# Patient Record
Sex: FEMALE | Race: BLACK OR AFRICAN AMERICAN | NOT HISPANIC OR LATINO | Employment: UNEMPLOYED | URBAN - NONMETROPOLITAN AREA
[De-identification: names, ages, dates, MRNs, and addresses within clinical notes are randomized per-mention and may not be internally consistent; named-entity substitution may affect disease eponyms.]

---

## 2020-01-02 ENCOUNTER — HOSPITAL ENCOUNTER (EMERGENCY)
Facility: HOSPITAL | Age: 40
Discharge: HOME | End: 2020-01-02
Attending: EMERGENCY MEDICINE | Admitting: EMERGENCY MEDICINE

## 2020-01-02 ENCOUNTER — APPOINTMENT (OUTPATIENT)
Dept: CT IMAGING | Facility: HOSPITAL | Age: 40
End: 2020-01-02

## 2020-01-02 VITALS
OXYGEN SATURATION: 100 % | HEART RATE: 62 BPM | SYSTOLIC BLOOD PRESSURE: 137 MMHG | DIASTOLIC BLOOD PRESSURE: 80 MMHG | HEIGHT: 66 IN | WEIGHT: 212 LBS | TEMPERATURE: 98.2 F | RESPIRATION RATE: 18 BRPM | BODY MASS INDEX: 34.07 KG/M2

## 2020-01-02 DIAGNOSIS — K52.9 GASTROENTERITIS: Primary | ICD-10-CM

## 2020-01-02 LAB
ALBUMIN SERPL-MCNC: 4.3 G/DL (ref 3.5–5.2)
ALBUMIN/GLOB SERPL: 1.2 G/DL
ALP SERPL-CCNC: 122 U/L (ref 39–117)
ALT SERPL W P-5'-P-CCNC: 14 U/L (ref 1–33)
ANION GAP SERPL CALCULATED.3IONS-SCNC: 12 MMOL/L (ref 5–15)
AST SERPL-CCNC: 18 U/L (ref 1–32)
B-HCG UR QL: NEGATIVE
BASOPHILS # BLD AUTO: 0.03 10*3/MM3 (ref 0–0.2)
BASOPHILS NFR BLD AUTO: 0.3 % (ref 0–1.5)
BILIRUB SERPL-MCNC: 0.4 MG/DL (ref 0.2–1.2)
BILIRUB UR QL STRIP: NEGATIVE
BUN BLD-MCNC: 12 MG/DL (ref 6–20)
BUN/CREAT SERPL: 15 (ref 7–25)
CALCIUM SPEC-SCNC: 9.6 MG/DL (ref 8.6–10.5)
CHLORIDE SERPL-SCNC: 103 MMOL/L (ref 98–107)
CLARITY UR: CLEAR
CO2 SERPL-SCNC: 23 MMOL/L (ref 22–29)
COLOR UR: YELLOW
CREAT BLD-MCNC: 0.8 MG/DL (ref 0.57–1)
DEPRECATED RDW RBC AUTO: 43.8 FL (ref 37–54)
EOSINOPHIL # BLD AUTO: 0.1 10*3/MM3 (ref 0–0.4)
EOSINOPHIL NFR BLD AUTO: 1 % (ref 0.3–6.2)
ERYTHROCYTE [DISTWIDTH] IN BLOOD BY AUTOMATED COUNT: 14.3 % (ref 12.3–15.4)
GFR SERPL CREATININE-BSD FRML MDRD: 97 ML/MIN/1.73
GLOBULIN UR ELPH-MCNC: 3.6 GM/DL
GLUCOSE BLD-MCNC: 93 MG/DL (ref 65–99)
GLUCOSE UR STRIP-MCNC: NEGATIVE MG/DL
HCT VFR BLD AUTO: 38.9 % (ref 34–46.6)
HGB BLD-MCNC: 12.5 G/DL (ref 12–15.9)
HGB UR QL STRIP.AUTO: NEGATIVE
HOLD SPECIMEN: NORMAL
HOLD SPECIMEN: NORMAL
IMM GRANULOCYTES # BLD AUTO: 0.03 10*3/MM3 (ref 0–0.05)
IMM GRANULOCYTES NFR BLD AUTO: 0.3 % (ref 0–0.5)
KETONES UR QL STRIP: NEGATIVE
LEUKOCYTE ESTERASE UR QL STRIP.AUTO: NEGATIVE
LIPASE SERPL-CCNC: 10 U/L (ref 13–60)
LYMPHOCYTES # BLD AUTO: 1 10*3/MM3 (ref 0.7–3.1)
LYMPHOCYTES NFR BLD AUTO: 9.5 % (ref 19.6–45.3)
MCH RBC QN AUTO: 27.5 PG (ref 26.6–33)
MCHC RBC AUTO-ENTMCNC: 32.1 G/DL (ref 31.5–35.7)
MCV RBC AUTO: 85.5 FL (ref 79–97)
MONOCYTES # BLD AUTO: 0.7 10*3/MM3 (ref 0.1–0.9)
MONOCYTES NFR BLD AUTO: 6.7 % (ref 5–12)
NEUTROPHILS # BLD AUTO: 8.63 10*3/MM3 (ref 1.7–7)
NEUTROPHILS NFR BLD AUTO: 82.2 % (ref 42.7–76)
NITRITE UR QL STRIP: NEGATIVE
NRBC BLD AUTO-RTO: 0 /100 WBC (ref 0–0.2)
PH UR STRIP.AUTO: 5.5 [PH] (ref 5–9)
PLATELET # BLD AUTO: 357 10*3/MM3 (ref 140–450)
PMV BLD AUTO: 10.2 FL (ref 6–12)
POTASSIUM BLD-SCNC: 4 MMOL/L (ref 3.5–5.2)
PROT SERPL-MCNC: 7.9 G/DL (ref 6–8.5)
PROT UR QL STRIP: NEGATIVE
RBC # BLD AUTO: 4.55 10*6/MM3 (ref 3.77–5.28)
SODIUM BLD-SCNC: 138 MMOL/L (ref 136–145)
SP GR UR STRIP: 1.01 (ref 1–1.03)
UROBILINOGEN UR QL STRIP: NORMAL
WBC NRBC COR # BLD: 10.49 10*3/MM3 (ref 3.4–10.8)
WHOLE BLOOD HOLD SPECIMEN: NORMAL
WHOLE BLOOD HOLD SPECIMEN: NORMAL

## 2020-01-02 PROCEDURE — 96374 THER/PROPH/DIAG INJ IV PUSH: CPT

## 2020-01-02 PROCEDURE — 96372 THER/PROPH/DIAG INJ SC/IM: CPT

## 2020-01-02 PROCEDURE — 74177 CT ABD & PELVIS W/CONTRAST: CPT

## 2020-01-02 PROCEDURE — 81025 URINE PREGNANCY TEST: CPT | Performed by: EMERGENCY MEDICINE

## 2020-01-02 PROCEDURE — 83690 ASSAY OF LIPASE: CPT | Performed by: EMERGENCY MEDICINE

## 2020-01-02 PROCEDURE — 25010000002 KETOROLAC TROMETHAMINE PER 15 MG: Performed by: NURSE PRACTITIONER

## 2020-01-02 PROCEDURE — 99284 EMERGENCY DEPT VISIT MOD MDM: CPT

## 2020-01-02 PROCEDURE — 96375 TX/PRO/DX INJ NEW DRUG ADDON: CPT

## 2020-01-02 PROCEDURE — 80053 COMPREHEN METABOLIC PANEL: CPT | Performed by: EMERGENCY MEDICINE

## 2020-01-02 PROCEDURE — 81003 URINALYSIS AUTO W/O SCOPE: CPT | Performed by: EMERGENCY MEDICINE

## 2020-01-02 PROCEDURE — 25010000002 DICYCLOMINE PER 20 MG: Performed by: NURSE PRACTITIONER

## 2020-01-02 PROCEDURE — 85025 COMPLETE CBC W/AUTO DIFF WBC: CPT

## 2020-01-02 PROCEDURE — 25010000002 IOPAMIDOL 61 % SOLUTION: Performed by: EMERGENCY MEDICINE

## 2020-01-02 RX ORDER — SODIUM CHLORIDE 0.9 % (FLUSH) 0.9 %
10 SYRINGE (ML) INJECTION AS NEEDED
Status: DISCONTINUED | OUTPATIENT
Start: 2020-01-02 | End: 2020-01-02 | Stop reason: HOSPADM

## 2020-01-02 RX ORDER — KETOROLAC TROMETHAMINE 30 MG/ML
30 INJECTION, SOLUTION INTRAMUSCULAR; INTRAVENOUS ONCE
Status: COMPLETED | OUTPATIENT
Start: 2020-01-02 | End: 2020-01-02

## 2020-01-02 RX ORDER — DICYCLOMINE HYDROCHLORIDE 10 MG/ML
20 INJECTION INTRAMUSCULAR ONCE
Status: COMPLETED | OUTPATIENT
Start: 2020-01-02 | End: 2020-01-02

## 2020-01-02 RX ORDER — ONDANSETRON 4 MG/1
4 TABLET, ORALLY DISINTEGRATING ORAL EVERY 6 HOURS PRN
Qty: 12 TABLET | Refills: 0 | Status: SHIPPED | OUTPATIENT
Start: 2020-01-02 | End: 2021-09-13

## 2020-01-02 RX ORDER — DICYCLOMINE HCL 20 MG
20 TABLET ORAL EVERY 6 HOURS PRN
Qty: 15 TABLET | Refills: 0 | Status: SHIPPED | OUTPATIENT
Start: 2020-01-02 | End: 2021-09-13

## 2020-01-02 RX ORDER — FAMOTIDINE 10 MG/ML
20 INJECTION, SOLUTION INTRAVENOUS ONCE
Status: COMPLETED | OUTPATIENT
Start: 2020-01-02 | End: 2020-01-02

## 2020-01-02 RX ADMIN — KETOROLAC TROMETHAMINE 30 MG: 30 INJECTION, SOLUTION INTRAMUSCULAR; INTRAVENOUS at 19:15

## 2020-01-02 RX ADMIN — SODIUM CHLORIDE 1000 ML: 900 INJECTION, SOLUTION INTRAVENOUS at 19:06

## 2020-01-02 RX ADMIN — DICYCLOMINE HYDROCHLORIDE 20 MG: 20 INJECTION INTRAMUSCULAR at 19:13

## 2020-01-02 RX ADMIN — IOPAMIDOL 95 ML: 612 INJECTION, SOLUTION INTRAVENOUS at 18:58

## 2020-01-02 RX ADMIN — FAMOTIDINE 20 MG: 10 INJECTION INTRAVENOUS at 19:08

## 2020-01-02 NOTE — ED TRIAGE NOTES
Pt presents to ED with c/o abdominal cramping, nausea, vomiting, and diarrhea that started around 0300 this AM.

## 2020-01-03 NOTE — ED PROVIDER NOTES
"Subjective   Patient presents to the ER with complaints of abdominal cramping since 3:00 this morning.  She states the episodes will last about 10 seconds and occur multiple times per hour.  She does report looser stools than normal.  She reports dry heaving with nausea.  Denies dizziness or chest pain.            Review of Systems   Constitutional: Positive for chills (With intermittent hot flashes). Negative for activity change, appetite change and fever.   HENT: Negative.    Respiratory: Negative for shortness of breath.    Cardiovascular: Negative for chest pain.   Gastrointestinal: Positive for abdominal pain, diarrhea (Looser stools than normal), nausea and vomiting. Negative for abdominal distention and constipation.   Genitourinary: Negative.    Neurological: Negative for dizziness, light-headedness and headaches.       History reviewed. No pertinent past medical history.    Allergies   Allergen Reactions   • Sulfa Antibiotics Shortness Of Breath       History reviewed. No pertinent surgical history.    History reviewed. No pertinent family history.    Social History     Socioeconomic History   • Marital status: Legally      Spouse name: Not on file   • Number of children: Not on file   • Years of education: Not on file   • Highest education level: Not on file   Tobacco Use   • Smoking status: Current Every Day Smoker     Packs/day: 0.50   Substance and Sexual Activity   • Alcohol use: Yes     Comment: socially   • Drug use: Yes     Types: Marijuana           Objective    /79   Pulse 65   Temp 98.2 °F (36.8 °C) (Oral)   Resp 18   Ht 167.6 cm (66\")   Wt 96.2 kg (212 lb)   LMP 12/15/2019 (Exact Date)   SpO2 100%   BMI 34.22 kg/m²     Physical Exam   Constitutional: She is oriented to person, place, and time. She appears well-developed and well-nourished.  Non-toxic appearance. She does not appear ill. No distress.   Lying in bed talking on phone   HENT:   Head: Normocephalic and " atraumatic.   Cardiovascular: Normal rate, regular rhythm, normal heart sounds and intact distal pulses.   Pulmonary/Chest: Effort normal and breath sounds normal. No respiratory distress. She has no wheezes.   Abdominal: Soft. Normal appearance. She exhibits no distension. Bowel sounds are decreased. There is tenderness (Generalized with increased pain over right lower quadrant). There is no rebound.   Neurological: She is alert and oriented to person, place, and time.   Skin: Skin is warm and dry. Capillary refill takes less than 2 seconds.   Psychiatric: She has a normal mood and affect. Her behavior is normal.       Procedures  Results for orders placed or performed during the hospital encounter of 01/02/20   Pregnancy, Urine - Urine, Clean Catch   Result Value Ref Range    HCG, Urine QL Negative Negative   Urinalysis With Culture If Indicated - Urine, Clean Catch   Result Value Ref Range    Color, UA Yellow Yellow, Straw, Dark Yellow, Khadra    Appearance, UA Clear Clear    pH, UA 5.5 5.0 - 9.0    Specific Lillian, UA 1.015 1.003 - 1.030    Glucose, UA Negative Negative    Ketones, UA Negative Negative    Bilirubin, UA Negative Negative    Blood, UA Negative Negative    Protein, UA Negative Negative    Leuk Esterase, UA Negative Negative    Nitrite, UA Negative Negative    Urobilinogen, UA 0.2 E.U./dL 0.2 - 1.0 E.U./dL   Comprehensive Metabolic Panel   Result Value Ref Range    Glucose 93 65 - 99 mg/dL    BUN 12 6 - 20 mg/dL    Creatinine 0.80 0.57 - 1.00 mg/dL    Sodium 138 136 - 145 mmol/L    Potassium 4.0 3.5 - 5.2 mmol/L    Chloride 103 98 - 107 mmol/L    CO2 23.0 22.0 - 29.0 mmol/L    Calcium 9.6 8.6 - 10.5 mg/dL    Total Protein 7.9 6.0 - 8.5 g/dL    Albumin 4.30 3.50 - 5.20 g/dL    ALT (SGPT) 14 1 - 33 U/L    AST (SGOT) 18 1 - 32 U/L    Alkaline Phosphatase 122 (H) 39 - 117 U/L    Total Bilirubin 0.4 0.2 - 1.2 mg/dL    eGFR  African Amer 97 >60 mL/min/1.73    Globulin 3.6 gm/dL    A/G Ratio 1.2 g/dL     BUN/Creatinine Ratio 15.0 7.0 - 25.0    Anion Gap 12.0 5.0 - 15.0 mmol/L   Lipase   Result Value Ref Range    Lipase 10 (L) 13 - 60 U/L   CBC Auto Differential   Result Value Ref Range    WBC 10.49 3.40 - 10.80 10*3/mm3    RBC 4.55 3.77 - 5.28 10*6/mm3    Hemoglobin 12.5 12.0 - 15.9 g/dL    Hematocrit 38.9 34.0 - 46.6 %    MCV 85.5 79.0 - 97.0 fL    MCH 27.5 26.6 - 33.0 pg    MCHC 32.1 31.5 - 35.7 g/dL    RDW 14.3 12.3 - 15.4 %    RDW-SD 43.8 37.0 - 54.0 fl    MPV 10.2 6.0 - 12.0 fL    Platelets 357 140 - 450 10*3/mm3    Neutrophil % 82.2 (H) 42.7 - 76.0 %    Lymphocyte % 9.5 (L) 19.6 - 45.3 %    Monocyte % 6.7 5.0 - 12.0 %    Eosinophil % 1.0 0.3 - 6.2 %    Basophil % 0.3 0.0 - 1.5 %    Immature Grans % 0.3 0.0 - 0.5 %    Neutrophils, Absolute 8.63 (H) 1.70 - 7.00 10*3/mm3    Lymphocytes, Absolute 1.00 0.70 - 3.10 10*3/mm3    Monocytes, Absolute 0.70 0.10 - 0.90 10*3/mm3    Eosinophils, Absolute 0.10 0.00 - 0.40 10*3/mm3    Basophils, Absolute 0.03 0.00 - 0.20 10*3/mm3    Immature Grans, Absolute 0.03 0.00 - 0.05 10*3/mm3    nRBC 0.0 0.0 - 0.2 /100 WBC   Light Blue Top   Result Value Ref Range    Extra Tube hold for add-on    Green Top (Gel)   Result Value Ref Range    Extra Tube Hold for add-ons.    Lavender Top   Result Value Ref Range    Extra Tube hold for add-on    Gold Top - SST   Result Value Ref Range    Extra Tube Hold for add-ons.      Ct Abdomen Pelvis With Contrast    Result Date: 1/2/2020  Narrative: PROCEDURE: CT ABDOMEN PELVIS W CONTRAST INDICATION: Right lower quadrant pain COMPARISON: 2/17/2016  TECHNIQUE:  - reconstructions:  Axial, coronal, sagittal  - contrast:  oral:  yes/no     intravenous:  95 ml of Omnipaque 300  This exam was performed according to our departmental dose-optimization program, which includes automated exposure control, adjustment of the mA and/or kV according to patient size and/or use of iterative reconstruction technique. FINDINGS:   - - - CT ABDOMEN - - -   THORAX  (INFERIOR):  - LUNG BASES:  Clear  - PLEURA:  No fluid or mass  - HEART: Normal size, no pericardial fluid  - MISC:  N/A   ABDOMEN:  - LIVER:  Normal size/contour, no ductal dilatation, no focal lesion  - GB:  Grossly negative  - CBD:  Grossly negative  - SPLEEN:  Normal size and contour. Scattered tiny calcifications are present, consistent with healed granulomatous disease  - PANCREAS:  Normal in size, contour, no focal mass  - VISCERA:  Normal caliber, mild wall thickening of the mid to distal small bowel, which is also fluid-filled. This may represent enteritis.  - MESENTERY: No mesenteric mass  - CAVITY:  No free abdominal fluid, no free intraperitoneal air  - BODY WALL:  Tiny umbilical hernia contains only fat  - OSSEOUS:  Grossly negative for age  - MISC:  RETROPERITONEUM:  - KIDNEYS:Normal size/contour, no collecting system dilation                   No evidence of an enhancing mass  - URETERS:  Normal course, caliber  - ADRENALS:  Normal size, contour  - MISC:  No sig. retroperitoneal adenopathy or mass  - VASCULAR:  Aorta / iliacs: wnl for age  - - - CT PELVIS - - -  - VISCERA:  Normal caliber small/large bowel. No colonic thickening identified       - APPENDIX: Within normal limits  - MESENTERY:  No mass  - VASCULAR:  Within normal limits for age  - CAVITY:  Small amount of free fluid in pelvis. No free air  - BLADDER:  Unremarkable  - OSSEOUS:  Within normal limits  - MISC:  - UTERUS/OVARY: Grossly unremarkable. Probable corpus luteum cyst in the right ovary. This measures 1.6 x 1.3 x 1.2 cm. It requires no further follow-up.      Impression: 1. Mildly thick-walled appearance of mid to distal small bowel, may represent enteritis. No dilated bowel is appreciated 2. Trace of free fluid in pelvis 3. The appendix is visualized and appears normal. 4. Tiny umbilical hernia contains only fat Electronically signed by:  Lilliana Xiong MD  1/2/2020 7:26 PM CST Workstation: [id number]             ED Course  ED Course  as of Jan 02 2010   Thu Jan 02, 2020 2009 Labs and CT reviewed with patient. Advised clear liquid diet and increased fluid for next 24 hours and advance as tolerated. Patient verbalized understanding. Patient to return if symptoms worsen.     [SH]      ED Course User Index  [SH] Dilma Juárez APRN                                               [facility]    Final diagnoses:   Gastroenteritis            Dilma Juárez APRN  01/02/20 2031

## 2021-09-13 ENCOUNTER — TELEPHONE (OUTPATIENT)
Dept: OBSTETRICS AND GYNECOLOGY | Facility: CLINIC | Age: 41
End: 2021-09-13

## 2021-09-13 ENCOUNTER — OFFICE VISIT (OUTPATIENT)
Dept: GASTROENTEROLOGY | Facility: CLINIC | Age: 41
End: 2021-09-13

## 2021-09-13 VITALS
WEIGHT: 196.6 LBS | HEART RATE: 80 BPM | HEIGHT: 66 IN | BODY MASS INDEX: 31.6 KG/M2 | DIASTOLIC BLOOD PRESSURE: 86 MMHG | SYSTOLIC BLOOD PRESSURE: 122 MMHG

## 2021-09-13 DIAGNOSIS — Z80.0 FAMILY HISTORY OF COLON CANCER: ICD-10-CM

## 2021-09-13 DIAGNOSIS — R10.84 GENERALIZED ABDOMINAL PAIN: Primary | ICD-10-CM

## 2021-09-13 DIAGNOSIS — R19.8 ABNORMAL BOWEL HABITS: ICD-10-CM

## 2021-09-13 DIAGNOSIS — K58.2 IRRITABLE BOWEL SYNDROME WITH BOTH CONSTIPATION AND DIARRHEA: ICD-10-CM

## 2021-09-13 PROCEDURE — 99214 OFFICE O/P EST MOD 30 MIN: CPT | Performed by: NURSE PRACTITIONER

## 2021-09-13 RX ORDER — DIPHENOXYLATE HYDROCHLORIDE AND ATROPINE SULFATE 2.5; .025 MG/1; MG/1
1 TABLET ORAL DAILY
COMMUNITY
End: 2022-11-08

## 2021-09-13 RX ORDER — POLYETHYLENE GLYCOL 3350, SODIUM SULFATE, SODIUM CHLORIDE, POTASSIUM CHLORIDE, ASCORBIC ACID, SODIUM ASCORBATE 7.5-2.691G
1000 KIT ORAL EVERY 12 HOURS
Qty: 1000 ML | Refills: 0 | Status: SHIPPED | OUTPATIENT
Start: 2021-09-13 | End: 2022-11-08

## 2021-09-13 RX ORDER — SACCHAROMYCES BOULARDII 250 MG
250 CAPSULE ORAL DAILY
COMMUNITY

## 2021-09-13 RX ORDER — DEXTROSE AND SODIUM CHLORIDE 5; .45 G/100ML; G/100ML
30 INJECTION, SOLUTION INTRAVENOUS CONTINUOUS PRN
Status: CANCELLED | OUTPATIENT
Start: 2021-09-30

## 2021-09-13 NOTE — PROGRESS NOTES
Chief Complaint   Patient presents with   • Irritable Bowel Syndrome       Subjective    Marcela Lacy is a 41 y.o. female. she is here today   41-year-old female presents to discuss chronic irritable bowel syndrome and recent worsening diarrhea.  Reports she was diagnosed with irritable bowel 10 to 12 years ago.  She underwent EGD per Dr. Bradshaw which noted H. pylori gastritis that she was treated did better for a while and symptoms have recently worsened.  States symptoms have always been ongoing but have become more intolerable recently.  Reports her maternal grandmother recently  of colon cancer.    Irritable Bowel Syndrome  This is a chronic problem. The current episode started more than 1 year ago. The problem occurs every several days. Associated symptoms include abdominal pain, a change in bowel habit (back and forth), fatigue, nausea and vomiting. Pertinent negatives include no anorexia, arthralgias, chest pain, chills, congestion, coughing, diaphoresis, fever, headaches, joint swelling, myalgias, neck pain, numbness, rash, sore throat, swollen glands, urinary symptoms, vertigo, visual change or weakness. The symptoms are aggravated by eating. The treatment provided mild relief.       Plan; schedule patient for EGD and colonoscopy due to abdominal pain, family history of colon cancer, abnormal bowel habits will obtain biopsies to rule out inflammatory bowel disease     The following portions of the patient's history were reviewed and updated as appropriate:   Past Medical History:   Diagnosis Date   • GERD (gastroesophageal reflux disease)    • Hyperlipidemia    • IBS (irritable bowel syndrome)      History reviewed. No pertinent surgical history.  Family History   Problem Relation Age of Onset   • Colon cancer Maternal Grandmother      OB History    No obstetric history on file.       Prior to Admission medications    Medication Sig Start Date End Date Taking? Authorizing Provider  Progress Notes by Ольга Forbes RN at 12/15/2020 11:22 AM     Author: Ольга Forbes RN Service: -- Author Type: Registered Nurse    Filed: 12/15/2020 11:25 AM Encounter Date: 12/15/2020 Status: Attested    : Ольга Forbes RN (Registered Nurse) Cosigner: Dawson Cloud MD at 12/15/2020 12:17 PM    Attestation signed by Dawson Cloud MD at 12/15/2020 12:17 PM    Agree with plans for warfarin management                Anticoagulation Annual Referral Renewal Review    Ginna Quiroga's chart reviewed for annual renewal of referral to anticoagulation monitoring.        Criteria for anticoagulation nurse and/or pharmacist renewal met   Warfarin indication: Atrial Fibrillation and DVT Yes, per indication   Current with INR monitoring/compliant Yes Yes   Date of last office visit 11/9/2020 Yes, had office visit within last year   Time in Therapeutic Range (TTR) 89.6 % Yes, TTR > 60%       Ginna Quiroga met all criteria for anticoagulation management program initiated renewal.  New INR standing orders and anticoagulation referral renewal placed.      Ольга Forbes RN  11:22 AM              multivitamin (MULTI VITAMIN DAILY PO) Take  by mouth Daily.   Yes Provider, Jayde, MD   saccharomyces boulardii (FLORASTOR) 250 MG capsule Take 250 mg by mouth 2 (Two) Times a Day.   Yes Provider, MD Jayde   dicyclomine (BENTYL) 20 MG tablet Take 1 tablet by mouth Every 6 (Six) Hours As Needed (stomach cramps). 1/2/20 9/13/21  Dilma Juárez APRN   ondansetron ODT (ZOFRAN-ODT) 4 MG disintegrating tablet Take 1 tablet by mouth Every 6 (Six) Hours As Needed for Nausea or Vomiting for up to 12 doses. 1/2/20 9/13/21  Dilma Juárez APRN     Allergies   Allergen Reactions   • Sulfa Antibiotics Shortness Of Breath     Social History     Socioeconomic History   • Marital status: Legally      Spouse name: Not on file   • Number of children: Not on file   • Years of education: Not on file   • Highest education level: Not on file   Tobacco Use   • Smoking status: Current Every Day Smoker     Packs/day: 0.50   Vaping Use   • Vaping Use: Never used   Substance and Sexual Activity   • Alcohol use: Yes     Comment: socially   • Drug use: Yes     Types: Marijuana   • Sexual activity: Defer       Review of Systems  Review of Systems   Constitutional: Positive for fatigue and unexpected weight change (increased ). Negative for activity change, appetite change, chills, diaphoresis and fever.   HENT: Negative for congestion, sore throat and trouble swallowing.    Respiratory: Negative for cough and shortness of breath.    Cardiovascular: Negative for chest pain.   Gastrointestinal: Positive for abdominal pain, change in bowel habit (back and forth), diarrhea, nausea and vomiting. Negative for abdominal distention, anal bleeding, anorexia, blood in stool, constipation and rectal pain.   Musculoskeletal: Negative for arthralgias, joint swelling, myalgias and neck pain.   Skin: Negative for pallor and rash.   Neurological: Negative for vertigo, weakness, light-headedness, numbness and headaches.  "       /86 (BP Location: Left arm)   Pulse 80   Ht 167.6 cm (66\")   Wt 89.2 kg (196 lb 9.6 oz)   BMI 31.73 kg/m²     Objective    Physical Exam  Constitutional:       Appearance: Normal appearance. She is normal weight.   HENT:      Head: Normocephalic and atraumatic.   Pulmonary:      Effort: Pulmonary effort is normal.   Abdominal:      General: Bowel sounds are normal. There is no distension.      Palpations: Abdomen is soft.      Tenderness: There is generalized abdominal tenderness.   Neurological:      Mental Status: She is alert.       Admission on 01/02/2020, Discharged on 01/02/2020   Component Date Value Ref Range Status   • HCG, Urine QL 01/02/2020 Negative  Negative Final   • Color, UA 01/02/2020 Yellow  Yellow, Straw, Dark Yellow, Khadra Final   • Appearance, UA 01/02/2020 Clear  Clear Final   • pH, UA 01/02/2020 5.5  5.0 - 9.0 Final   • Specific Gravity, UA 01/02/2020 1.015  1.003 - 1.030 Final   • Glucose, UA 01/02/2020 Negative  Negative Final   • Ketones, UA 01/02/2020 Negative  Negative Final   • Bilirubin, UA 01/02/2020 Negative  Negative Final   • Blood, UA 01/02/2020 Negative  Negative Final   • Protein, UA 01/02/2020 Negative  Negative Final   • Leuk Esterase, UA 01/02/2020 Negative  Negative Final   • Nitrite, UA 01/02/2020 Negative  Negative Final   • Urobilinogen, UA 01/02/2020 0.2 E.U./dL  0.2 - 1.0 E.U./dL Final   • Glucose 01/02/2020 93  65 - 99 mg/dL Final   • BUN 01/02/2020 12  6 - 20 mg/dL Final   • Creatinine 01/02/2020 0.80  0.57 - 1.00 mg/dL Final   • Sodium 01/02/2020 138  136 - 145 mmol/L Final   • Potassium 01/02/2020 4.0  3.5 - 5.2 mmol/L Final   • Chloride 01/02/2020 103  98 - 107 mmol/L Final   • CO2 01/02/2020 23.0  22.0 - 29.0 mmol/L Final   • Calcium 01/02/2020 9.6  8.6 - 10.5 mg/dL Final   • Total Protein 01/02/2020 7.9  6.0 - 8.5 g/dL Final   • Albumin 01/02/2020 4.30  3.50 - 5.20 g/dL Final   • ALT (SGPT) 01/02/2020 14  1 - 33 U/L Final   • AST (SGOT) 01/02/2020 " 18  1 - 32 U/L Final   • Alkaline Phosphatase 01/02/2020 122* 39 - 117 U/L Final   • Total Bilirubin 01/02/2020 0.4  0.2 - 1.2 mg/dL Final   • eGFR  African Amer 01/02/2020 97  >60 mL/min/1.73 Final   • Globulin 01/02/2020 3.6  gm/dL Final   • A/G Ratio 01/02/2020 1.2  g/dL Final   • BUN/Creatinine Ratio 01/02/2020 15.0  7.0 - 25.0 Final   • Anion Gap 01/02/2020 12.0  5.0 - 15.0 mmol/L Final   • Lipase 01/02/2020 10* 13 - 60 U/L Final   • Extra Tube 01/02/2020 hold for add-on   Final    Auto resulted   • Extra Tube 01/02/2020 Hold for add-ons.   Final    Auto resulted.   • Extra Tube 01/02/2020 hold for add-on   Final    Auto resulted   • Extra Tube 01/02/2020 Hold for add-ons.   Final    Auto resulted.   • WBC 01/02/2020 10.49  3.40 - 10.80 10*3/mm3 Final   • RBC 01/02/2020 4.55  3.77 - 5.28 10*6/mm3 Final   • Hemoglobin 01/02/2020 12.5  12.0 - 15.9 g/dL Final   • Hematocrit 01/02/2020 38.9  34.0 - 46.6 % Final   • MCV 01/02/2020 85.5  79.0 - 97.0 fL Final   • MCH 01/02/2020 27.5  26.6 - 33.0 pg Final   • MCHC 01/02/2020 32.1  31.5 - 35.7 g/dL Final   • RDW 01/02/2020 14.3  12.3 - 15.4 % Final   • RDW-SD 01/02/2020 43.8  37.0 - 54.0 fl Final   • MPV 01/02/2020 10.2  6.0 - 12.0 fL Final   • Platelets 01/02/2020 357  140 - 450 10*3/mm3 Final   • Neutrophil % 01/02/2020 82.2* 42.7 - 76.0 % Final   • Lymphocyte % 01/02/2020 9.5* 19.6 - 45.3 % Final   • Monocyte % 01/02/2020 6.7  5.0 - 12.0 % Final   • Eosinophil % 01/02/2020 1.0  0.3 - 6.2 % Final   • Basophil % 01/02/2020 0.3  0.0 - 1.5 % Final   • Immature Grans % 01/02/2020 0.3  0.0 - 0.5 % Final   • Neutrophils, Absolute 01/02/2020 8.63* 1.70 - 7.00 10*3/mm3 Final   • Lymphocytes, Absolute 01/02/2020 1.00  0.70 - 3.10 10*3/mm3 Final   • Monocytes, Absolute 01/02/2020 0.70  0.10 - 0.90 10*3/mm3 Final   • Eosinophils, Absolute 01/02/2020 0.10  0.00 - 0.40 10*3/mm3 Final   • Basophils, Absolute 01/02/2020 0.03  0.00 - 0.20 10*3/mm3 Final   • Immature Grans, Absolute  01/02/2020 0.03  0.00 - 0.05 10*3/mm3 Final   • nRBC 01/02/2020 0.0  0.0 - 0.2 /100 WBC Final     Assessment/Plan      1. Generalized abdominal pain    2. Irritable bowel syndrome with both constipation and diarrhea    3. Abnormal bowel habits    4. Family history of colon cancer    .       Orders placed during this encounter include:  Orders Placed This Encounter   Procedures   • Follow Anesthesia Guidelines / Standing Orders     Standing Status:   Future   • Obtain Informed Consent     Standing Status:   Future     Order Specific Question:   Informed Consent Given For     Answer:   egd and colonoscopy       ESOPHAGOGASTRODUODENOSCOPY (N/A), COLONOSCOPY (N/A)    Review and/or summary of lab tests, radiology, procedures, medications. Review and summary of old records and obtaining of history. The risks and benefits of my recommendations, as well as other treatment options were discussed with the patient today. Questions were answered.    New Medications Ordered This Visit   Medications   • MoviPrep 100 g reconstituted solution powder     Sig: Take 1,000 mL by mouth Every 12 (Twelve) Hours.     Dispense:  1000 mL     Refill:  0       Follow-up: Return in about 4 weeks (around 10/11/2021) for Recheck, After test.          This document has been electronically signed by MICHELLE Hobson on September 14, 2021 09:59 CDT           I spent 22 minutes caring for Marcela on this date of service. This time includes time spent by me in the following activities:preparing for the visit, reviewing tests, obtaining and/or reviewing a separately obtained history, performing a medically appropriate examination and/or evaluation , counseling and educating the patient/family/caregiver, ordering medications, tests, or procedures, referring and communicating with other health care professionals , documenting information in the medical record and care coordination    Results for orders placed or performed during the hospital encounter  of 01/02/20   Gold Top - SST   Result Value Ref Range    Extra Tube Hold for add-ons.    Green Top (Gel)   Result Value Ref Range    Extra Tube Hold for add-ons.    Urinalysis With Culture If Indicated - Urine, Clean Catch    Specimen: Urine, Clean Catch   Result Value Ref Range    Color, UA Yellow Yellow, Straw, Dark Yellow, Khadra    Appearance, UA Clear Clear    pH, UA 5.5 5.0 - 9.0    Specific [place], UA 1.015 1.003 - 1.030    Glucose, UA Negative Negative    Ketones, UA Negative Negative    Bilirubin, UA Negative Negative    Blood, UA Negative Negative    Protein, UA Negative Negative    Leuk Esterase, UA Negative Negative    Nitrite, UA Negative Negative    Urobilinogen, UA 0.2 E.U./dL 0.2 - 1.0 E.U./dL   CBC Auto Differential    Specimen: Blood   Result Value Ref Range    WBC 10.49 3.40 - 10.80 10*3/mm3    RBC 4.55 3.77 - 5.28 10*6/mm3    Hemoglobin 12.5 12.0 - 15.9 g/dL    Hematocrit 38.9 34.0 - 46.6 %    MCV 85.5 79.0 - 97.0 fL    MCH 27.5 26.6 - 33.0 pg    MCHC 32.1 31.5 - 35.7 g/dL    RDW 14.3 12.3 - 15.4 %    RDW-SD 43.8 37.0 - 54.0 fl    MPV 10.2 6.0 - 12.0 fL    Platelets 357 140 - 450 10*3/mm3    Neutrophil % 82.2 (H) 42.7 - 76.0 %    Lymphocyte % 9.5 (L) 19.6 - 45.3 %    Monocyte % 6.7 5.0 - 12.0 %    Eosinophil % 1.0 0.3 - 6.2 %    Basophil % 0.3 0.0 - 1.5 %    Immature Grans % 0.3 0.0 - 0.5 %    Neutrophils, Absolute 8.63 (H) 1.70 - 7.00 10*3/mm3    Lymphocytes, Absolute 1.00 0.70 - 3.10 10*3/mm3    Monocytes, Absolute 0.70 0.10 - 0.90 10*3/mm3    Eosinophils, Absolute 0.10 0.00 - 0.40 10*3/mm3    Basophils, Absolute 0.03 0.00 - 0.20 10*3/mm3    Immature Grans, Absolute 0.03 0.00 - 0.05 10*3/mm3    nRBC 0.0 0.0 - 0.2 /100 WBC   Lavender Top   Result Value Ref Range    Extra Tube hold for add-on    Light Blue Top   Result Value Ref Range    Extra Tube hold for add-on    Pregnancy, Urine - Urine, Clean Catch    Specimen: Urine, Clean Catch   Result Value Ref Range    HCG, Urine QL Negative Negative    Lipase    Specimen: Blood   Result Value Ref Range    Lipase 10 (L) 13 - 60 U/L   Comprehensive Metabolic Panel    Specimen: Blood   Result Value Ref Range    Glucose 93 65 - 99 mg/dL    BUN 12 6 - 20 mg/dL    Creatinine 0.80 0.57 - 1.00 mg/dL    Sodium 138 136 - 145 mmol/L    Potassium 4.0 3.5 - 5.2 mmol/L    Chloride 103 98 - 107 mmol/L    CO2 23.0 22.0 - 29.0 mmol/L    Calcium 9.6 8.6 - 10.5 mg/dL    Total Protein 7.9 6.0 - 8.5 g/dL    Albumin 4.30 3.50 - 5.20 g/dL    ALT (SGPT) 14 1 - 33 U/L    AST (SGOT) 18 1 - 32 U/L    Alkaline Phosphatase 122 (H) 39 - 117 U/L    Total Bilirubin 0.4 0.2 - 1.2 mg/dL    eGFR  African Amer 97 >60 mL/min/1.73    Globulin 3.6 gm/dL    A/G Ratio 1.2 g/dL    BUN/Creatinine Ratio 15.0 7.0 - 25.0    Anion Gap 12.0 5.0 - 15.0 mmol/L   Results for orders placed or performed in visit on 08/13/14   Converted (Historical) Gyn Cytology    Specimen: Other   Result Value Ref Range    Spec Descr 1 SPECIMEN(S): Cervical/Endocervical     Clinical Information       CLINICAL HISTORY: Reason for Pap Smear: Routine Smear, LMP: 7/24/14    Specimen Adequacy         SPECIMEN ADEQUACY:  Satisfactory for evaluation:  endocervical/transformation zone component  present.      Microorganisms         MICROORGANISMS:  Shift in sedrick suggestive of bacterial vaginosis.      Final Diagnosis         INTERPRETATION:  Negative for intraepithelial lesion or malignancy.      CONVERTED (HISTORICAL) FINAL PATHOLOGIST       Diagnostician:  LOUISA LEE(ASCP)  Cytotechnologist      Final Pathologist/Cyto tech 3       Diagnostician:  KAYE CHAVEZ M.D.  Pathologist  Electronically Signed 08/18/2014      ICD9 Diagnosis Code 1 ICD-9: V72.31     DISCLAIMER       The Pap smear is a screening test that aids in the detection of cervical  cancer and cancer precursors.  Both false positive and false negative  results can occur.  The test should be used at regular intervals, and positive results  should be confirmed  before definitive therapy.     Results for orders placed or performed in visit on 03/15/12   Converted Surgical Pathology    Specimen: Tissue   Result Value Ref Range    Spec Descr 1 SPECIMEN(S): A ANTRAL BIOPSY     Preoperative Diagnosis   PREOPERATIVE DIAGNOSIS:  None Given       Postoperative Diagnosis   POSTOPERATIVE DIAGNOSIS:  Gastritis       Gross Description         GROSS DESCRIPTION:  The specimen consists of a mucosal biopsy measuring up to 0.2 cm in  greatest dimension.  All embedded.      Final Diagnosis         FINAL DIAGNOSIS:  GASTRIC ANTRUM, MUCOSAL BIOPSY:       CHRONIC GASTRITIS.       POSITIVE FOR HELICOBACTER PYLORI.      Comment         COMMENT:  Helicobacter immunoperoxidase stain performed.  HP immunostain was developed and its performance characteristics  determined by [facility].  It has not been  cleared or approved by the U.S. Food and Drug Administration.  The FDA  has determined that such clearance or approval is not necessary.  This  test is used for clinical purposes.  It should not be regarded as  investigational or for research.  This laboratory is certified under the  Clinical Laboratory Improvement Amendments of 1988 (CLIA-88) as  qualified to perform high complexity clinical laboratory testing.      CONVERTED (HISTORICAL) FINAL PATHOLOGIST       Diagnostician:  KAYE CHAVEZ M.D.  Pathologist  Electronically Signed 03/16/2012      Diagnosis Code   DIAGNOSIS CODE:  6      Results for orders placed or performed in visit on 07/29/09   Converted Surgical Pathology    Specimen: Tissue   Result Value Ref Range    Spec Descr 1 SPECIMEN(S): A ENDOCERVICAL     Specimen description 2 SPECIMEN(S): B ENDOMETRIUM     Specimen description 3 SPECIMEN(S): C FIBROID     Clinical Information         CLINICAL HISTORY:  Pelvic pain, menometrorrhagia      Clinical Diagnosis         CLINICAL DIAGNOSIS:  Pelvic pain, menometrorrhagia      Gross Description         GROSS  DESCRIPTION:  The first specimen consists of endocervical tissue approximately  comprising a volume of 0.5 cc.  All embedded as A.  The second specimen consists of fragments of endometrial tissue  approximately comprising a volume of 1.0 cc.  All embedded as B.  The third specimen consists of a rounded grayish white mass measuring  1.0 cm in greatest dimension.  It is well encapsulated and its cut  section is solid and contracted.  Bisected and all embedded as C.      Final Diagnosis         FINAL DIAGNOSIS:  A.  ENDOCERVICAL BIOPSY:            MUCOUS WITH MINUTE FRAGMENTS OF UNREMARKABLE            STRATIFIED SQUAMOUS EPITHELIUM.  B.  ENDOMETRIAL BIOPSY:            FRAGMENTS OF UNREMARKABLE TISSUES FROM LOWER            UTERINE SEGMENT.  C.  LEIOMYOMA, UTERUS.    DIAGNOSIS CODE:  8      CONVERTED (HISTORICAL) FINAL PATHOLOGIST       Diagnostician:  KAYE CHAVEZ M.D.  Pathologist  Electronically Signed 07/30/2009     Results for orders placed or performed in visit on 09/29/08   Converted (Historical) Gyn Cytology    Specimen: Other   Result Value Ref Range    Spec Descr 1 SPECIMEN(S): Cervical/Endocervical     Clinical Information       CLINICAL HISTORY: Reason for Pap Smear: Routine Smear, LMP: No LMP  Provided by Clinician      Comment         SPECIMEN ADEQUACY:  Satisfactory for evaluation:  endocervical/transformation zone component  present.    MICROORGANISMS:  Shift in sedrick suggestive of bacterial vaginosis.    INTERPRETATION:  Negative for intraepithelial lesion or malignancy.      CONVERTED (HISTORICAL) FINAL PATHOLOGIST       Diagnostician:  JOSE LEE([facility])  Cytotechnologist  Electronically Signed 09/30/2008      ICD9 Diagnosis Code 1 ICD-9: V72.31

## 2021-09-21 ENCOUNTER — LAB (OUTPATIENT)
Dept: LAB | Facility: HOSPITAL | Age: 41
End: 2021-09-21

## 2021-09-21 DIAGNOSIS — Z01.818 PREOP TESTING: Primary | ICD-10-CM

## 2021-09-21 LAB — SARS-COV-2 N GENE RESP QL NAA+PROBE: NOT DETECTED

## 2021-09-21 PROCEDURE — C9803 HOPD COVID-19 SPEC COLLECT: HCPCS

## 2021-09-21 PROCEDURE — 87635 SARS-COV-2 COVID-19 AMP PRB: CPT

## 2021-09-28 ENCOUNTER — LAB (OUTPATIENT)
Dept: LAB | Facility: HOSPITAL | Age: 41
End: 2021-09-28

## 2021-09-28 DIAGNOSIS — Z01.818 PREOP TESTING: Primary | ICD-10-CM

## 2021-09-28 LAB — SARS-COV-2 N GENE RESP QL NAA+PROBE: NOT DETECTED

## 2021-09-28 PROCEDURE — 87635 SARS-COV-2 COVID-19 AMP PRB: CPT

## 2021-09-28 PROCEDURE — C9803 HOPD COVID-19 SPEC COLLECT: HCPCS

## 2021-09-30 ENCOUNTER — ANESTHESIA (OUTPATIENT)
Dept: GASTROENTEROLOGY | Facility: HOSPITAL | Age: 41
End: 2021-09-30

## 2021-09-30 ENCOUNTER — ANESTHESIA EVENT (OUTPATIENT)
Dept: GASTROENTEROLOGY | Facility: HOSPITAL | Age: 41
End: 2021-09-30

## 2021-09-30 ENCOUNTER — HOSPITAL ENCOUNTER (OUTPATIENT)
Facility: HOSPITAL | Age: 41
Setting detail: HOSPITAL OUTPATIENT SURGERY
Discharge: HOME | End: 2021-09-30
Attending: INTERNAL MEDICINE | Admitting: INTERNAL MEDICINE

## 2021-09-30 VITALS
RESPIRATION RATE: 18 BRPM | HEIGHT: 66 IN | SYSTOLIC BLOOD PRESSURE: 133 MMHG | OXYGEN SATURATION: 100 % | BODY MASS INDEX: 31.07 KG/M2 | WEIGHT: 193.34 LBS | DIASTOLIC BLOOD PRESSURE: 90 MMHG | HEART RATE: 72 BPM | TEMPERATURE: 96.7 F

## 2021-09-30 DIAGNOSIS — R19.8 ABNORMAL BOWEL HABITS: ICD-10-CM

## 2021-09-30 DIAGNOSIS — R10.84 GENERALIZED ABDOMINAL PAIN: ICD-10-CM

## 2021-09-30 DIAGNOSIS — Z80.0 FAMILY HISTORY OF COLON CANCER: ICD-10-CM

## 2021-09-30 PROCEDURE — 25010000002 PROPOFOL 10 MG/ML EMULSION: Performed by: NURSE ANESTHETIST, CERTIFIED REGISTERED

## 2021-09-30 PROCEDURE — 45385 COLONOSCOPY W/LESION REMOVAL: CPT | Performed by: INTERNAL MEDICINE

## 2021-09-30 PROCEDURE — 45380 COLONOSCOPY AND BIOPSY: CPT | Performed by: INTERNAL MEDICINE

## 2021-09-30 PROCEDURE — 88305 TISSUE EXAM BY PATHOLOGIST: CPT

## 2021-09-30 PROCEDURE — 43239 EGD BIOPSY SINGLE/MULTIPLE: CPT | Performed by: INTERNAL MEDICINE

## 2021-09-30 RX ORDER — PROPOFOL 10 MG/ML
VIAL (ML) INTRAVENOUS AS NEEDED
Status: DISCONTINUED | OUTPATIENT
Start: 2021-09-30 | End: 2021-09-30 | Stop reason: SURG

## 2021-09-30 RX ORDER — DEXTROSE AND SODIUM CHLORIDE 5; .45 G/100ML; G/100ML
30 INJECTION, SOLUTION INTRAVENOUS CONTINUOUS PRN
Status: DISCONTINUED | OUTPATIENT
Start: 2021-09-30 | End: 2021-09-30 | Stop reason: HOSPADM

## 2021-09-30 RX ORDER — LIDOCAINE HYDROCHLORIDE 20 MG/ML
INJECTION, SOLUTION INTRAVENOUS AS NEEDED
Status: DISCONTINUED | OUTPATIENT
Start: 2021-09-30 | End: 2021-09-30 | Stop reason: SURG

## 2021-09-30 RX ADMIN — PROPOFOL 30 MG: 10 INJECTION, EMULSION INTRAVENOUS at 13:03

## 2021-09-30 RX ADMIN — PROPOFOL 40 MG: 10 INJECTION, EMULSION INTRAVENOUS at 13:12

## 2021-09-30 RX ADMIN — PROPOFOL 30 MG: 10 INJECTION, EMULSION INTRAVENOUS at 13:04

## 2021-09-30 RX ADMIN — PROPOFOL 20 MG: 10 INJECTION, EMULSION INTRAVENOUS at 13:05

## 2021-09-30 RX ADMIN — PROPOFOL 20 MG: 10 INJECTION, EMULSION INTRAVENOUS at 13:01

## 2021-09-30 RX ADMIN — PROPOFOL 20 MG: 10 INJECTION, EMULSION INTRAVENOUS at 13:10

## 2021-09-30 RX ADMIN — DEXTROSE AND SODIUM CHLORIDE 30 ML/HR: 5; 450 INJECTION, SOLUTION INTRAVENOUS at 12:50

## 2021-09-30 RX ADMIN — PROPOFOL 30 MG: 10 INJECTION, EMULSION INTRAVENOUS at 13:00

## 2021-09-30 RX ADMIN — LIDOCAINE HYDROCHLORIDE 100 MG: 20 INJECTION, SOLUTION INTRAVENOUS at 12:58

## 2021-09-30 RX ADMIN — PROPOFOL 40 MG: 10 INJECTION, EMULSION INTRAVENOUS at 13:07

## 2021-09-30 RX ADMIN — PROPOFOL 120 MG: 10 INJECTION, EMULSION INTRAVENOUS at 12:58

## 2021-09-30 NOTE — ANESTHESIA POSTPROCEDURE EVALUATION
Chief Complaint   Patient presents with    Ear Pain     pt c/o of b/l ear\"pressure\"     Pt reports that she was seen at [facility] on 3/23 and prescribed augmentin. Completed full 10 day course. Since then has noticed bilateral pressure intermittently. Denies any fever. Denies any other upper respiratory symptoms. Has some days where she notices more sinus pressure. Has been taking zyrtec daily. At night has been using her flonase. Has been doing this daily since the 23rd. Denies any other concerns at this time. Chief Complaint   Patient presents with    Ear Pain     pt c/o of b/l ear\"pressure\"     she is a 29y.o. year old female who presents for evalution. Reviewed PmHx, RxHx, FmHx, SocHx, AllgHx and updated and dated in the chart. Review of Systems - negative except as listed above in the HPI    Objective:     Vitals:    04/03/19 0748   BP: 128/84   Pulse: 88   Resp: 16   Temp: 98.5 °F (36.9 °C)   TempSrc: Oral   SpO2: 98%   Weight: 118 lb (53.5 kg)   Height: 5' 4\" (1.626 m)     Physical Examination: General appearance - alert, well appearing, and in no distress  Eyes - pupils equal and reactive, extraocular eye movements intact  Ears - bilateral TM's and external ear canals normal, moderate amount of fluid present behind bilateral TMs  Nose - mucosal congestion, mucosal erythema, clear rhinorrhea and sinuses normal and nontender  Mouth - mucous membranes moist, pharynx normal without lesions  Neck - supple, no significant adenopathy  Chest - clear to auscultation, no wheezes, rales or rhonchi, symmetric air entry  Heart - normal rate, regular rhythm, normal S1, S2, no murmurs    Assessment/ Plan:   Diagnoses and all orders for this visit:    1. ETD (Eustachian tube dysfunction), bilateral  -     azelastine (ASTEPRO) 0.15 % (205.5 mcg); 1 Spray by Both Nostrils route two (2) times a day. Hold flonase and start astepro. Also recommended taking sudafed as needed for a few days.    2. Patient: Marcela Royal    Procedure Summary     Date: 09/30/21 Room / Location: [facility] ENDOSCOPY 2 / [facility] ENDOSCOPY    Anesthesia Start: 1257 Anesthesia Stop: 1317    Procedures:       ESOPHAGOGASTRODUODENOSCOPY (N/A )      COLONOSCOPY (N/A ) Diagnosis:       Generalized abdominal pain      Abnormal bowel habits      Family history of colon cancer      (Generalized abdominal pain [R10.84])      (Abnormal bowel habits [R19.8])      (Family history of colon cancer [Z80.0])    Surgeons: Diane Mansfield MD Provider: Zelda Wilson CRNA    Anesthesia Type: MAC ASA Status: 3          Anesthesia Type: MAC    Vitals  No vitals data found for the desired time range.          Post Anesthesia Care and Evaluation    Patient location during evaluation: bedside  Patient participation: complete - patient participated  Level of consciousness: awake  Pain score: 0  Pain management: adequate  Airway patency: patent  Anesthetic complications: No anesthetic complications  PONV Status: none  Cardiovascular status: acceptable  Respiratory status: acceptable  Hydration status: acceptable       Seasonal allergic rhinitis due to pollen  -     azelastine (ASTEPRO) 0.15 % (205.5 mcg); 1 Spray by Both Nostrils route two (2) times a day. Continue zyrtec. Reviewed other supportive measures. Follow-up and Dispositions    · Return if symptoms worsen or fail to improve. I have discussed the diagnosis with the patient and the intended plan as seen in the above orders. The patient has received an after-visit summary and questions were answered concerning future plans. Medication Side Effects and Warnings were discussed with patient: yes  Patient Labs were reviewed and or requested: no  Patient Past Records were reviewed and or requested  yes  Patient / Caregiver Understanding of treatment plan was verbalized during office visit YES    JENNIFER Singh    There are no Patient Instructions on file for this visit.

## 2021-09-30 NOTE — ANESTHESIA PREPROCEDURE EVALUATION
Anesthesia Evaluation     NPO Solid Status: > 8 hours  NPO Liquid Status: > 8 hours           Airway   Mallampati: III  TM distance: >3 FB  Neck ROM: full  Dental - normal exam     Pulmonary - negative pulmonary ROS    breath sounds clear to auscultation  Cardiovascular     (+) hyperlipidemia,       Neuro/Psych- negative ROS  GI/Hepatic/Renal/Endo    (+) obesity,  GERD,      Musculoskeletal     Abdominal    Substance History      OB/GYN          Other                        Anesthesia Plan    ASA 3     MAC     intravenous induction     Anesthetic plan, all risks, benefits, and alternatives have been provided, discussed and informed consent has been obtained with: patient.

## 2021-10-04 LAB
LAB AP CASE REPORT: NORMAL
PATH REPORT.FINAL DX SPEC: NORMAL

## 2021-12-14 ENCOUNTER — HOSPITAL ENCOUNTER (EMERGENCY)
Facility: HOSPITAL | Age: 41
Discharge: LEFT BEFORE DISCHARGE | End: 2021-12-14

## 2021-12-14 VITALS
WEIGHT: 214.1 LBS | OXYGEN SATURATION: 100 % | BODY MASS INDEX: 34.41 KG/M2 | RESPIRATION RATE: 20 BRPM | HEIGHT: 66 IN | HEART RATE: 87 BPM | TEMPERATURE: 97.9 F

## 2021-12-14 PROCEDURE — 99211 OFF/OP EST MAY X REQ PHY/QHP: CPT

## 2021-12-14 NOTE — ED NOTES
Pt was nowhere to be found in hallway. This RN checked with triage and pt had left ER around 3pm.      Olive Glover, RN  12/14/21 [id number]

## 2022-04-04 ENCOUNTER — OFFICE VISIT (OUTPATIENT)
Dept: OBSTETRICS AND GYNECOLOGY | Facility: CLINIC | Age: 42
End: 2022-04-04

## 2022-04-04 VITALS
BODY MASS INDEX: 33.75 KG/M2 | HEIGHT: 66 IN | SYSTOLIC BLOOD PRESSURE: 136 MMHG | WEIGHT: 210 LBS | DIASTOLIC BLOOD PRESSURE: 86 MMHG

## 2022-04-04 DIAGNOSIS — Z11.3 SCREENING EXAMINATION FOR STD (SEXUALLY TRANSMITTED DISEASE): ICD-10-CM

## 2022-04-04 DIAGNOSIS — Z23 NEED FOR HPV VACCINE: ICD-10-CM

## 2022-04-04 DIAGNOSIS — R10.2 PELVIC PAIN: ICD-10-CM

## 2022-04-04 DIAGNOSIS — R87.810 CERVICAL HIGH RISK HUMAN PAPILLOMAVIRUS (HPV) DNA TEST POSITIVE: Primary | ICD-10-CM

## 2022-04-04 PROCEDURE — 87591 N.GONORRHOEAE DNA AMP PROB: CPT | Performed by: NURSE PRACTITIONER

## 2022-04-04 PROCEDURE — 87491 CHLMYD TRACH DNA AMP PROBE: CPT | Performed by: NURSE PRACTITIONER

## 2022-04-04 PROCEDURE — 90471 IMMUNIZATION ADMIN: CPT | Performed by: NURSE PRACTITIONER

## 2022-04-04 PROCEDURE — 88305 TISSUE EXAM BY PATHOLOGIST: CPT

## 2022-04-04 PROCEDURE — 57456 ENDOCERV CURETTAGE W/SCOPE: CPT | Performed by: NURSE PRACTITIONER

## 2022-04-04 PROCEDURE — 87661 TRICHOMONAS VAGINALIS AMPLIF: CPT | Performed by: NURSE PRACTITIONER

## 2022-04-04 NOTE — PROGRESS NOTES
[facility]  Gynecology  Procedure Note: Colposcopy     Work-up  NIL, +hrHPV 02/2022  Colpo w/ECC  NIL 02/2021  ASCUS, +hrHPV 01/2021  Previous pap smears normal with possible one abnormal when she was younger.     A ''time out'' was initiated by myself prior to procedure.  The patient was identified by name and date of birth.  The correct procedure, and correct site identified.  Correct positioning.  There is availability of necessary equipment.  Patient states she is not allergic to latex.     PE:  External genitalia: Normal  Vagina: Normal  Cervix: Normal  TMZ: visualized  Lugol's lesions: None  Mosaicism: None  Abnormal vessels: None  ECC: Yes  Biopsies: No  Satisfactory colposcopy: Yes    Pt tolerate procedure well.     A/P: Marcela Royal  is a  41 y.o.  presenting with +hrHPV on recent pap smear of cervix.    - Colposcopy w/ ECC  - RTC for TVUS d/t pelvic pain  - 1st Gardasil vaccine today  - Encouraged smoking cessation   - Reviewed instructions including no sex, tampons, or douching for at least 5 days

## 2022-04-05 LAB
C TRACH RRNA CVX QL NAA+PROBE: NEGATIVE
N GONORRHOEA RRNA SPEC QL NAA+PROBE: NEGATIVE
TRICHOMONAS VAGINALIS PCR: NEGATIVE

## 2022-04-05 PROCEDURE — 90651 9VHPV VACCINE 2/3 DOSE IM: CPT | Performed by: NURSE PRACTITIONER

## 2022-04-07 ENCOUNTER — TELEPHONE (OUTPATIENT)
Dept: OBSTETRICS AND GYNECOLOGY | Facility: CLINIC | Age: 42
End: 2022-04-07

## 2022-04-07 LAB
LAB AP CASE REPORT: NORMAL
LAB AP CLINICAL INFORMATION: NORMAL
PATH REPORT.FINAL DX SPEC: NORMAL

## 2022-04-11 ENCOUNTER — LAB (OUTPATIENT)
Dept: LAB | Facility: HOSPITAL | Age: 42
End: 2022-04-11

## 2022-04-11 DIAGNOSIS — N76.0 ACUTE VAGINITIS: ICD-10-CM

## 2022-04-11 DIAGNOSIS — N89.8 VAGINAL DISCHARGE: ICD-10-CM

## 2022-04-11 DIAGNOSIS — N76.0 ACUTE VAGINITIS: Primary | ICD-10-CM

## 2022-04-11 LAB
CANDIDA ALBICANS: NEGATIVE
GARDNERELLA VAGINALIS: NEGATIVE
T VAGINALIS DNA VAG QL PROBE+SIG AMP: NEGATIVE

## 2022-04-11 PROCEDURE — 87660 TRICHOMONAS VAGIN DIR PROBE: CPT

## 2022-04-11 PROCEDURE — 87480 CANDIDA DNA DIR PROBE: CPT

## 2022-04-11 PROCEDURE — 87510 GARDNER VAG DNA DIR PROBE: CPT

## 2022-10-04 ENCOUNTER — CLINICAL SUPPORT (OUTPATIENT)
Dept: OBSTETRICS AND GYNECOLOGY | Facility: CLINIC | Age: 42
End: 2022-10-04

## 2022-10-04 DIAGNOSIS — Z23 NEED FOR HPV VACCINE: Primary | ICD-10-CM

## 2022-10-04 PROCEDURE — 90651 9VHPV VACCINE 2/3 DOSE IM: CPT | Performed by: NURSE PRACTITIONER

## 2022-10-04 PROCEDURE — 90471 IMMUNIZATION ADMIN: CPT | Performed by: NURSE PRACTITIONER

## 2022-11-08 ENCOUNTER — LAB (OUTPATIENT)
Dept: LAB | Facility: HOSPITAL | Age: 42
End: 2022-11-08

## 2022-11-08 ENCOUNTER — OFFICE VISIT (OUTPATIENT)
Dept: OBSTETRICS AND GYNECOLOGY | Facility: CLINIC | Age: 42
End: 2022-11-08

## 2022-11-08 VITALS
SYSTOLIC BLOOD PRESSURE: 128 MMHG | HEIGHT: 66 IN | WEIGHT: 213.8 LBS | DIASTOLIC BLOOD PRESSURE: 82 MMHG | BODY MASS INDEX: 34.36 KG/M2

## 2022-11-08 DIAGNOSIS — Z91.89 AT RISK FOR FERTILITY PROBLEMS: ICD-10-CM

## 2022-11-08 DIAGNOSIS — N80.9 ENDOMETRIOSIS DETERMINED BY LAPAROSCOPY: ICD-10-CM

## 2022-11-08 DIAGNOSIS — D25.9 ABNORMAL UTERINE BLEEDING DUE TO LEIOMYOMA OF UTERUS: Primary | ICD-10-CM

## 2022-11-08 DIAGNOSIS — N93.9 ABNORMAL UTERINE BLEEDING DUE TO LEIOMYOMA OF UTERUS: Primary | ICD-10-CM

## 2022-11-08 LAB
DEPRECATED RDW RBC AUTO: 42.1 FL (ref 37–54)
ERYTHROCYTE [DISTWIDTH] IN BLOOD BY AUTOMATED COUNT: 14.5 % (ref 12.3–15.4)
HCT VFR BLD AUTO: 35.8 % (ref 34–46.6)
HGB BLD-MCNC: 11.9 G/DL (ref 12–15.9)
MCH RBC QN AUTO: 26.9 PG (ref 26.6–33)
MCHC RBC AUTO-ENTMCNC: 33.2 G/DL (ref 31.5–35.7)
MCV RBC AUTO: 81 FL (ref 79–97)
PLATELET # BLD AUTO: 435 10*3/MM3 (ref 140–450)
PMV BLD AUTO: 10.4 FL (ref 6–12)
RBC # BLD AUTO: 4.42 10*6/MM3 (ref 3.77–5.28)
WBC NRBC COR # BLD: 7.25 10*3/MM3 (ref 3.4–10.8)

## 2022-11-08 PROCEDURE — 99214 OFFICE O/P EST MOD 30 MIN: CPT | Performed by: OBSTETRICS & GYNECOLOGY

## 2022-11-08 PROCEDURE — 85027 COMPLETE CBC AUTOMATED: CPT | Performed by: OBSTETRICS & GYNECOLOGY

## 2022-11-08 PROCEDURE — 84443 ASSAY THYROID STIM HORMONE: CPT | Performed by: OBSTETRICS & GYNECOLOGY

## 2022-11-08 PROCEDURE — 36415 COLL VENOUS BLD VENIPUNCTURE: CPT | Performed by: OBSTETRICS & GYNECOLOGY

## 2022-11-08 PROCEDURE — 82397 CHEMILUMINESCENT ASSAY: CPT | Performed by: OBSTETRICS & GYNECOLOGY

## 2022-11-08 RX ORDER — CETIRIZINE HYDROCHLORIDE 10 MG/1
10 TABLET ORAL DAILY
COMMUNITY

## 2022-11-09 LAB — TSH SERPL DL<=0.05 MIU/L-ACNC: 1.32 UIU/ML (ref 0.27–4.2)

## 2022-11-09 RX ORDER — RELUGOLIX, ESTRADIOL HEMIHYDRATE, AND NORETHINDRONE ACETATE 40; 1; .5 MG/1; MG/1; MG/1
1 TABLET, FILM COATED ORAL DAILY
Qty: 30 TABLET | Refills: 11 | Status: SHIPPED | OUTPATIENT
Start: 2022-11-09 | End: 2023-03-09

## 2022-11-12 LAB — MIS SERPL-MCNC: 0.43 NG/ML

## 2022-11-14 NOTE — PROGRESS NOTES
"[facility]  Gynecology  Date of Service: 2022    CC: Surgery consult    HPI  Marcela Royal is a 42 y.o.  premenopausal female who presents to discuss surgery.      She saw MICHELLE Pinedo in 2022 for colposcopy.  At that time, she complained of period issues so an US was ordered.  This was completed last month and after reviewing results, patient desired for surgical consult.    TVUS:  Enlarged retroflexed uterus.  The uterus measures 9.58 cm in length by 6.18 cm AP by 9.62 cm transverse.  Uterine volume 297.90 mL.  Endometrium within normal limits at 6.7 mm.  4.14 cm subserosal fibroid midline posterior body of the uterus.  Less than 1 cm follicular cysts right ovary.  Right ovarian volume 13.77 mL.  1.5 cm simple appearing left ovarian cyst.  Additional less than 1 cm follicular cysts left ovary.  Left ovarian volume 19.12 mL.  No free fluid.    She states that her periods are \"horrendous.\"  She states that she has heavy bleeding and clots \"like babies.\"  She states that she will have to change a super tampon every 30 min to 1 hour plus a pad during her heaviest days.  She states that she has quite a bit of pain as well.  She states that the pain can be debilitating to the point that she can't move around.  She states that the Naproxen does help.  She states that on some months, it does not help.    She is not in a relationship now but she does want to know if she can have children in the future.    ROS  Review of Systems   Constitutional: Negative.    HENT: Negative.    Eyes: Negative.    Respiratory: Negative.    Cardiovascular: Negative.    Gastrointestinal: Negative.    Endocrine: Negative.    Genitourinary: Positive for menstrual problem and pelvic pain.   Musculoskeletal: Negative.    Skin: Negative.    Allergic/Immunologic: Negative.    Neurological: Negative.    Hematological: Negative.    Psychiatric/Behavioral: Negative.      GYN " HISTORY  Menarche: age 11  Menses: see HPI  History of STIs: Trichomonas, herpes  Last pap smear:   Abnormal pap smear history: ; colposcopy  Contraception: None     OB HISTORY  OB History    Para Term  AB Living   3 0 0 0 3 0   SAB IAB Ectopic Molar Multiple Live Births   2 1 0 0 0 0      # Outcome Date GA Lbr Mir/2nd Weight Sex Delivery Anes PTL Lv   3 IAB      TAB      2 SAB      SAB      1 SAB      SAB        PAST MEDICAL HISTORY  Past Medical History:   Diagnosis Date   • Abnormal Pap smear of cervix 2020   • Anxiety    • Depression    • Endometriosis determined by laparoscopy    • GERD (gastroesophageal reflux disease)    • Herpes    • Hyperlipidemia    • IBS (irritable bowel syndrome)    • Migraine    • Sickle cell anemia (HCC)    • Urogenital trichomoniasis 10/2021   • Varicella      PAST SURGICAL HISTORY  Past Surgical History:   Procedure Laterality Date   • COLONOSCOPY N/A 2021    Procedure: COLONOSCOPY;  Surgeon: Diane Mansfield MD;  Location: [facility] ENDOSCOPY;  Service: Gastroenterology;  Laterality: N/A;   • ENDOSCOPY N/A 2021    Procedure: ESOPHAGOGASTRODUODENOSCOPY;  Surgeon: Diane Mansfield MD;  Location: [facility] ENDOSCOPY;  Service: Gastroenterology;  Laterality: N/A;   • PELVIC LAPAROSCOPY     • WISDOM TOOTH EXTRACTION       FAMILY HISTORY  Family History   Problem Relation Age of Onset   • Diabetes Mother    • Hypertension Mother    • No Known Problems Brother    • No Known Problems Brother    • Breast cancer Paternal Grandmother    • Colon cancer Maternal Grandmother    • Breast cancer Maternal Aunt    • Breast cancer Paternal Aunt    • Uterine cancer Neg Hx    • Ovarian cancer Neg Hx      SOCIAL HISTORY  Social History     Socioeconomic History   • Marital status: Legally    Tobacco Use   • Smoking status: Every Day     Packs/day: 0.50     Years: 10.00     Pack years: 5.00     Types: Cigarettes   • Smokeless  "tobacco: Never   Vaping Use   • Vaping Use: Never used   Substance and Sexual Activity   • Alcohol use: Not Currently     Comment: socially   • Drug use: Yes     Types: Marijuana     Comment: 3 DAYS AGO (-)   • Sexual activity: Not Currently     Partners: Male     ALLERGIES  Allergies   Allergen Reactions   • Sulfa Antibiotics Shortness Of Breath     HOME MEDICATIONS  Prior to Admission medications    Medication Sig Start Date End Date Taking? Authorizing Provider   cetirizine (zyrTEC) 10 MG tablet Take 1 tablet by mouth Daily.   Yes Provider, MD Jayde   saccharomyces boulardii (FLORASTOR) 250 MG capsule Take 250 mg by mouth Daily.   Yes Provider, Jayde, MD DE LOS SANTOS  /82 (BP Location: Left arm, Patient Position: Sitting, Cuff Size: Adult)   Ht 167.6 cm (66\")   Wt 97 kg (213 lb 12.8 oz)   LMP 10/25/2022 (Exact Date)   BMI 34.51 kg/m²        General: Alert, healthy, no distress, well nourished and well developed.  Neurologic: Alert, oriented to person, place, and time.  Gait normal.  Cranial nerves II-XII grossly intact.  HEENT: Normocephalic, atraumatic.  Extraocular muscles intact, pupils equal and reactive times two.    Neck: Supple, no adenopathy, thyroid normal size, non-tender, without nodularity, trachea midline.  Lungs: Normal respiratory effort.  Clear to auscultation bilaterally.  No wheezes, rhonci, or rales.  Heart: Regular rate and rhythm.  No murmer, rub or gallop.  Abdomen: Soft, non-tender, non-distended,no masses, no hepatosplenomegaly, no hernia.  Skin: No rash, no lesions.  Extremities: No cyanosis, clubbing or edema.    IMPRESSION  Marcela Royal is a 42 y.o.  presenting with AUB-L as well as endometriosis contributing to her bleeding and pain.  Given that she is considering future fertility, will hold on surgical options and manage with medical management.  Discussing using Myfembree given fibroid and endometriosis.  Will recheck in 4 months to see how " she is doing.    PLAN    1. Abnormal uterine bleeding due to leiomyoma of uterus  - TSH  - CBC (No Diff)  - Relugolix-Estradiol-Norethind (Myfembree) 40-1-0.5 MG tablet; Take 1 tablet by mouth Daily.  Dispense: 30 tablet; Refill: 11    2. Endometriosis determined by laparoscopy    3. At risk for fertility problems  - Antimullerian Hormone (AMH)         This document has been electronically signed by Gertrudis Bradshaw MD on November 14, 2022 12:38 CST.

## 2022-11-17 ENCOUNTER — TELEPHONE (OUTPATIENT)
Dept: OBSTETRICS AND GYNECOLOGY | Facility: CLINIC | Age: 42
End: 2022-11-17

## 2022-11-17 ENCOUNTER — OFFICE VISIT (OUTPATIENT)
Dept: OBSTETRICS AND GYNECOLOGY | Facility: CLINIC | Age: 42
End: 2022-11-17

## 2022-11-17 VITALS
BODY MASS INDEX: 34.39 KG/M2 | DIASTOLIC BLOOD PRESSURE: 80 MMHG | HEIGHT: 66 IN | SYSTOLIC BLOOD PRESSURE: 124 MMHG | WEIGHT: 214 LBS

## 2022-11-17 DIAGNOSIS — Z11.3 SCREEN FOR STD (SEXUALLY TRANSMITTED DISEASE): ICD-10-CM

## 2022-11-17 DIAGNOSIS — N76.0 ACUTE VAGINITIS: Primary | ICD-10-CM

## 2022-11-17 LAB
CANDIDA ALBICANS: NEGATIVE
GARDNERELLA VAGINALIS: POSITIVE
T VAGINALIS DNA VAG QL PROBE+SIG AMP: NEGATIVE

## 2022-11-17 PROCEDURE — 87510 GARDNER VAG DNA DIR PROBE: CPT | Performed by: NURSE PRACTITIONER

## 2022-11-17 PROCEDURE — 87660 TRICHOMONAS VAGIN DIR PROBE: CPT | Performed by: NURSE PRACTITIONER

## 2022-11-17 PROCEDURE — 87480 CANDIDA DNA DIR PROBE: CPT | Performed by: NURSE PRACTITIONER

## 2022-11-17 PROCEDURE — 99212 OFFICE O/P EST SF 10 MIN: CPT | Performed by: NURSE PRACTITIONER

## 2022-11-17 RX ORDER — FLUCONAZOLE 150 MG/1
TABLET ORAL
Qty: 2 TABLET | Refills: 0 | Status: SHIPPED | OUTPATIENT
Start: 2022-11-17 | End: 2023-03-09

## 2022-11-17 RX ORDER — METRONIDAZOLE 500 MG/1
500 TABLET ORAL 2 TIMES DAILY
Qty: 14 TABLET | Refills: 0 | Status: SHIPPED | OUTPATIENT
Start: 2022-11-17 | End: 2022-11-24

## 2022-11-17 NOTE — PROGRESS NOTES
Subjective   Marcela Royal is a 42 y.o. possible infection    History of Present Illness  LMP: 10/25/2022  Pap: ASCUS, +hrHPV 01/2021  Colpo c ECC: negative 02/2021    Pt presents with complaints of vaginal irritation, edema, and increase in discharge.  Around a week ago she had unprotected intercourse with former partner.  She believes it is just a yeast infection because she took a single diflucan tablet with some relief.  She desires STD screening.    Vaginitis  This is a new problem. The current episode started in the past 7 days. The problem occurs daily. The problem has been gradually improving. Pertinent negatives include no abdominal pain, chest pain, chills, diaphoresis, fatigue, fever, headaches, rash, urinary symptoms or weakness. Nothing aggravates the symptoms. Treatments tried: diflucan. The treatment provided mild relief.       The following portions of the patient's history were reviewed and updated as appropriate: allergies, current medications, past family history, past medical history, past social history, past surgical history and problem list.    Review of Systems   Constitutional: Negative for chills, diaphoresis, fatigue, fever and unexpected weight change.   Respiratory: Negative for apnea, chest tightness and shortness of breath.    Cardiovascular: Negative for chest pain and palpitations.   Gastrointestinal: Negative for abdominal distention, abdominal pain, constipation and diarrhea.   Genitourinary: Positive for vaginal discharge. Negative for decreased urine volume, difficulty urinating, dyspareunia, dysuria, enuresis, flank pain, frequency, genital sores, hematuria, pelvic pain, urgency, vaginal bleeding and vaginal pain.   Skin: Negative for rash.   Neurological: Negative for weakness and headaches.   Psychiatric/Behavioral: Negative for sleep disturbance and suicidal ideas.         Objective   Physical Exam  Vitals and nursing note reviewed.   Constitutional:        General: She is awake. She is not in acute distress.     Appearance: Normal appearance. She is well-developed and well-groomed. She is not ill-appearing, toxic-appearing or diaphoretic.   Pulmonary:      Effort: Pulmonary effort is normal.   Skin:     General: Skin is warm and dry.   Neurological:      Mental Status: She is alert and oriented to person, place, and time.   Psychiatric:         Attention and Perception: Attention and perception normal.         Mood and Affect: Mood and affect normal.         Speech: Speech normal.         Behavior: Behavior normal. Behavior is cooperative.           Assessment & Plan   Diagnoses and all orders for this visit:    1. Acute vaginitis (Primary)  -     Gardnerella vaginalis, Trichomonas vaginalis, Candida albicans, DNA - Swab, Vagina; Future  -     Gardnerella vaginalis, Trichomonas vaginalis, Candida albicans, DNA - Swab, Vagina    2. Screen for STD (sexually transmitted disease)  -     Chlamydia trachomatis, Neisseria gonorrhoeae, Trichomonas vaginalis, PCR - Urine, Urine, Clean Catch  -     Hepatitis C Antibody  -     HIV-1 & HIV-2 Antibodies  -     RPR        Labs to r/o vaginal infection.  Pt prefers to self swab.  Wait a couple weeks then come in for STD screening labs.  Will call with results as they are available.        .

## 2023-03-09 ENCOUNTER — OFFICE VISIT (OUTPATIENT)
Dept: OBSTETRICS AND GYNECOLOGY | Facility: CLINIC | Age: 43
End: 2023-03-09
Payer: OTHER GOVERNMENT

## 2023-03-09 DIAGNOSIS — N93.9 ABNORMAL UTERINE BLEEDING DUE TO LEIOMYOMA OF UTERUS: Primary | ICD-10-CM

## 2023-03-09 DIAGNOSIS — N76.0 VAGINAL INFECTION: ICD-10-CM

## 2023-03-09 DIAGNOSIS — D25.9 ABNORMAL UTERINE BLEEDING DUE TO LEIOMYOMA OF UTERUS: Primary | ICD-10-CM

## 2023-03-09 DIAGNOSIS — N80.9 ENDOMETRIOSIS DETERMINED BY LAPAROSCOPY: ICD-10-CM

## 2023-03-09 PROCEDURE — 99442 PR PHYS/QHP TELEPHONE EVALUATION 11-20 MIN: CPT | Performed by: OBSTETRICS & GYNECOLOGY

## 2023-03-09 RX ORDER — METRONIDAZOLE 500 MG/1
500 TABLET ORAL 2 TIMES DAILY
Qty: 14 TABLET | Refills: 0 | Status: SHIPPED | OUTPATIENT
Start: 2023-03-09 | End: 2023-03-16

## 2023-03-09 RX ORDER — FLUCONAZOLE 150 MG/1
TABLET ORAL
Qty: 2 TABLET | Refills: 0 | Status: SHIPPED | OUTPATIENT
Start: 2023-03-09

## 2023-03-09 NOTE — PROGRESS NOTES
"[facility]  Gynecology  Date of Service: 2023    CC: Myfembree follow-up    HPI  Marcela Royal is a 42 y.o.  premenopausal female who presents for 4 month follow-up after starting Myfembree for uterine fibroids and endometriosis.    She saw MICHELLE Pinedo in 2022 for colposcopy.  At that time, she complained of period issues so an US was ordered.  This was completed last month and after reviewing results, patient desired for surgical consult.     TVUS:  Enlarged retroflexed uterus.  The uterus measures 9.58 cm in length by 6.18 cm AP by 9.62 cm transverse.  Uterine volume 297.90 mL.  Endometrium within normal limits at 6.7 mm.  4.14 cm subserosal fibroid midline posterior body of the uterus.  Less than 1 cm follicular cysts right ovary.  Right ovarian volume 13.77 mL.  1.5 cm simple appearing left ovarian cyst.  Additional less than 1 cm follicular cysts left ovary.  Left ovarian volume 19.12 mL.  No free fluid.     She was seen in 2022 for surgical consult.  She states that her periods are \"horrendous.\"  She states that she has heavy bleeding and clots \"like babies.\"  She states that she will have to change a super tampon every 30 min to 1 hour plus a pad during her heaviest days.  She states that she has quite a bit of pain as well.  She states that the pain can be debilitating to the point that she can't move around.  She states that the Naproxen does help.  She states that on some months, it does not help.  She is not in a relationship now but she does want to know if she can have children in the future.      22 15:43  TSH Baseline: 1.320  Hemoglobin: 11.9 (L)    She presents today for follow-up.  She states that she only took 2 weeks but then [facility]'t [facility] so she stopped it all together.  She states that some months her periods are great and some months they are horrible.  She states that she would " like to opt for surgery.  She does have a history of endometriosis in .  She states that she has a vaginal infection and requests ABX.  She states that she started a new job 2 months ago and doesn't have time to take off or have PTO built up.    ROS  Review of Systems   Constitutional: Negative.    HENT: Negative.    Eyes: Negative.    Respiratory: Negative.    Cardiovascular: Negative.    Gastrointestinal: Negative.    Endocrine: Negative.    Genitourinary: Positive for menstrual problem and pelvic pain.   Musculoskeletal: Negative.    Skin: Negative.    Allergic/Immunologic: Negative.    Neurological: Negative.    Hematological: Negative.    Psychiatric/Behavioral: Negative.      GYN HISTORY  Menarche: age 11  Menses: see HPI  History of STIs: Trichomonas, herpes  Last pap smear:   Abnormal pap smear history: ; colposcopy  Contraception: None     OB HISTORY  OB History    Para Term  AB Living   3 0 0 0 3 0   SAB IAB Ectopic Molar Multiple Live Births   2 1 0 0 0 0      # Outcome Date GA Lbr Mir/2nd Weight Sex Delivery Anes PTL Lv   3 IAB      TAB      2 SAB      SAB      1 SAB      SAB        PAST MEDICAL HISTORY  Past Medical History:   Diagnosis Date   • Abnormal Pap smear of cervix 2020   • Anxiety    • Depression    • Endometriosis determined by laparoscopy    • GERD (gastroesophageal reflux disease)    • Herpes    • Hyperlipidemia    • IBS (irritable bowel syndrome)    • Migraine    • Sickle cell anemia (HCC)    • Urogenital trichomoniasis 10/2021   • Varicella      PAST SURGICAL HISTORY  Past Surgical History:   Procedure Laterality Date   • COLONOSCOPY N/A 2021    Procedure: COLONOSCOPY;  Surgeon: Diane Mansfield MD;  Location: [facility] ENDOSCOPY;  Service: Gastroenterology;  Laterality: N/A;   • ENDOSCOPY N/A 2021    Procedure: ESOPHAGOGASTRODUODENOSCOPY;  Surgeon: Diane Mansfield MD;  Location: [facility] ENDOSCOPY;  Service:  Gastroenterology;  Laterality: N/A;   • PELVIC LAPAROSCOPY     • WISDOM TOOTH EXTRACTION       FAMILY HISTORY  Family History   Problem Relation Age of Onset   • Diabetes Mother    • Hypertension Mother    • No Known Problems Brother    • No Known Problems Brother    • Breast cancer Paternal Grandmother    • Colon cancer Maternal Grandmother    • Breast cancer Maternal Aunt    • Breast cancer Paternal Aunt    • Uterine cancer Neg Hx    • Ovarian cancer Neg Hx      SOCIAL HISTORY  Social History     Socioeconomic History   • Marital status: Legally    Tobacco Use   • Smoking status: Every Day     Packs/day: 0.50     Years: 10.00     Pack years: 5.00     Types: Cigarettes   • Smokeless tobacco: Never   Vaping Use   • Vaping Use: Never used   Substance and Sexual Activity   • Alcohol use: Not Currently     Comment: socially   • Drug use: Yes     Types: Marijuana     Comment: 3 DAYS AGO (21)   • Sexual activity: Not Currently     Partners: Male     ALLERGIES  Allergies   Allergen Reactions   • Sulfa Antibiotics Shortness Of Breath     HOME MEDICATIONS  Prior to Admission medications    Medication Sig Start Date End Date Taking? Authorizing Provider   cetirizine (zyrTEC) 10 MG tablet Take 1 tablet by mouth Daily.   Yes Provider, MD Jayde   saccharomyces boulardii (FLORASTOR) 250 MG capsule Take 1 capsule by mouth Daily.   Yes Provider, MD Jayde     JOSE CRUZ Royal is a 42 y.o.  presenting with AUB-L and endometriosis with heavy menses and dysmenorrhea.  Patient was interested in surgery.  Discussed myomectomy vs hysterectomy.  At first, she desired myomectomy.  I discussed that this would remove the fibroid but that she could still have problems with bleeding due to her endometriosis; patient voiced understanding.  She desires to proceed with hysterectomy.  She states that she just started a new job and cannot take any time off at this time.  Discussed  that she does need to have an endometrial biopsy prior to surgery; voices understanding.  She will call back to schedule that appointment so we can schedule her surgery.    PLAN    1. Abnormal uterine bleeding due to leiomyoma of uterus    2. Endometriosis determined by laparoscopy    3. Vaginal infection  - metroNIDAZOLE (Flagyl) 500 MG tablet; Take 1 tablet by mouth 2 (Two) Times a Day for 7 days.  Dispense: 14 tablet; Refill: 0  - fluconazole (DIFLUCAN) 150 MG tablet; Take one tablet today and another in 3 days.  Dispense: 2 tablet; Refill: 0    This document has been electronically signed by Gertrudis Bradshaw MD on March 10, 2023 16:40 CST.    This visit has been rescheduled as a phone visit to comply with patient safety concerns in accordance with CDC recommendations.  Total time of discussion was 12 minutes.  The use of a telephone visit has been reviewed with the patient and verbal informed consent has been obtained.

## 2023-03-10 PROBLEM — R10.84 GENERALIZED ABDOMINAL PAIN: Status: RESOLVED | Noted: 2021-09-13 | Resolved: 2023-03-10

## 2023-03-10 PROBLEM — R19.8 ABNORMAL BOWEL HABITS: Status: RESOLVED | Noted: 2021-09-13 | Resolved: 2023-03-10

## 2023-04-20 ENCOUNTER — CLINICAL SUPPORT (OUTPATIENT)
Dept: OBSTETRICS AND GYNECOLOGY | Facility: CLINIC | Age: 43
End: 2023-04-20
Payer: OTHER GOVERNMENT

## 2023-04-20 DIAGNOSIS — Z23 NEED FOR HPV VACCINE: Primary | ICD-10-CM

## 2023-04-26 ENCOUNTER — OFFICE VISIT (OUTPATIENT)
Dept: FAMILY MEDICINE CLINIC | Facility: CLINIC | Age: 43
End: 2023-04-26
Payer: OTHER GOVERNMENT

## 2023-04-26 VITALS
BODY MASS INDEX: 33.47 KG/M2 | HEART RATE: 63 BPM | SYSTOLIC BLOOD PRESSURE: 140 MMHG | WEIGHT: 208.25 LBS | DIASTOLIC BLOOD PRESSURE: 80 MMHG | HEIGHT: 66 IN

## 2023-04-26 DIAGNOSIS — E78.2 MIXED HYPERLIPIDEMIA: Primary | ICD-10-CM

## 2023-04-26 DIAGNOSIS — M25.461 BILATERAL KNEE SWELLING: ICD-10-CM

## 2023-04-26 DIAGNOSIS — Z86.59 HISTORY OF NIGHT TERRORS: ICD-10-CM

## 2023-04-26 DIAGNOSIS — G89.29 CHRONIC JOINT PAIN: ICD-10-CM

## 2023-04-26 DIAGNOSIS — R53.82 CHRONIC FATIGUE: ICD-10-CM

## 2023-04-26 DIAGNOSIS — M79.89 SWELLING OF BOTH HANDS: ICD-10-CM

## 2023-04-26 DIAGNOSIS — M25.50 CHRONIC JOINT PAIN: ICD-10-CM

## 2023-04-26 DIAGNOSIS — M25.462 BILATERAL KNEE SWELLING: ICD-10-CM

## 2023-04-26 RX ORDER — HYDROXYZINE PAMOATE 25 MG/1
25 CAPSULE ORAL 3 TIMES DAILY PRN
COMMUNITY

## 2023-04-26 RX ORDER — PRAZOSIN HYDROCHLORIDE 2 MG/1
2 CAPSULE ORAL NIGHTLY
COMMUNITY

## 2023-04-26 NOTE — PROGRESS NOTES
Subjective   Marcela Royal is a 42 y.o. female.   .Cc: follow up of chronic medical issues    HPI  The patient comes in to [facility] care . She has a history of Hyperlipidemia,Fatigue,Joint Pain,Swelling in her knees and hands and Night Terrors.They are stable.    The following portions of the patient's history were reviewed and updated as appropriate: allergies, current medications, past family history, past medical history, past social history, past surgical history and problem list.    Review of Systems   Constitutional: Positive for fatigue. Negative for activity change, appetite change, chills, diaphoresis, fever and unexpected weight change.   HENT: Positive for congestion, dental problem, postnasal drip, rhinorrhea, sinus pressure, sneezing and tinnitus. Negative for drooling, ear discharge, ear pain, facial swelling, hearing loss, mouth sores, nosebleeds, sinus pain, sore throat, trouble swallowing and voice change.    Eyes: Positive for photophobia and itching. Negative for pain, discharge, redness and visual disturbance.   Respiratory: Positive for cough. Negative for choking, chest tightness, shortness of breath, wheezing and stridor.    Cardiovascular: Negative for chest pain, palpitations and leg swelling.   Gastrointestinal: Positive for constipation, diarrhea and nausea. Negative for abdominal distention, abdominal pain, anal bleeding, blood in stool, rectal pain and vomiting.   Endocrine: Positive for polyuria. Negative for cold intolerance, heat intolerance, polydipsia and polyphagia.   Genitourinary: Positive for dyspareunia, menstrual problem, pelvic pain, urgency, vaginal bleeding and vaginal pain. Negative for decreased urine volume, difficulty urinating, dysuria, enuresis, flank pain, frequency, genital sores, hematuria and vaginal discharge.   Musculoskeletal: Positive for arthralgias, back pain and joint swelling. Negative for gait problem, myalgias, neck pain and neck  stiffness.   Skin: Negative for color change, pallor, rash and wound.   Allergic/Immunologic: Positive for environmental allergies. Negative for food allergies and immunocompromised state.   Neurological: Positive for dizziness, light-headedness, numbness and headaches. Negative for tremors, seizures, syncope, facial asymmetry, speech difficulty and weakness.   Hematological: Negative for adenopathy. Does not bruise/bleed easily.   Psychiatric/Behavioral: Positive for agitation, decreased concentration, dysphoric mood and sleep disturbance. Negative for behavioral problems, confusion, hallucinations, self-injury and suicidal ideas. The patient is nervous/anxious and is hyperactive.        Objective   Physical Exam  Vitals reviewed.   Constitutional:       Appearance: Normal appearance.   HENT:      Head: Normocephalic and atraumatic.      Right Ear: Tympanic membrane, ear canal and external ear normal.      Left Ear: Tympanic membrane, ear canal and external ear normal.      Nose: Nose normal.      Mouth/Throat:      Mouth: Mucous membranes are moist.      Pharynx: Oropharynx is clear.   Eyes:      Extraocular Movements: Extraocular movements intact.      Pupils: Pupils are equal, round, and reactive to light.   Cardiovascular:      Rate and Rhythm: Normal rate and regular rhythm.      Heart sounds: Normal heart sounds. No murmur heard.    No friction rub. No gallop.   Pulmonary:      Effort: Pulmonary effort is normal. No respiratory distress.      Breath sounds: Normal breath sounds. No stridor. No wheezing, rhonchi or rales.   Chest:      Chest wall: No tenderness.   Abdominal:      General: Bowel sounds are normal. There is no distension.      Palpations: Abdomen is soft. There is no mass.      Tenderness: There is no abdominal tenderness. There is no guarding or rebound.      Hernia: No hernia is present.   Musculoskeletal:      Cervical back: Normal range of motion and neck supple.      Comments: The  "neck.shoulders,elbows,wrists,hips,knees and ankles as well as the back are non tender.   Skin:     General: Skin is warm and dry.   Neurological:      Mental Status: She is alert.           Visit Vitals  /80   Pulse 63   Ht 167.6 cm (66\")   Wt 94.5 kg (208 lb 4 oz)   LMP 03/14/2023 (Exact Date)   Breastfeeding No   BMI 33.61 kg/m²     Body mass index is 33.61 kg/m².      Assessment/Plan   Diagnoses and all orders for this visit:    1. Mixed hyperlipidemia (Primary)  -     Comprehensive metabolic panel; Future  -     CBC w AUTO Differential; Future  -     TSH; Future  -     T4, free; Future  -     Hemoglobin A1c; Future  -     Lipid Panel; Future  -     Vitamin D 25 hydroxy; Future  -     ASHUTOSH; Future  -     C-reactive protein; Future  -     Rheumatoid Factor; Future  -     Antistreptolysin O screen; Future  -     Uric acid; Future    2. Chronic fatigue  -     Comprehensive metabolic panel; Future  -     CBC w AUTO Differential; Future  -     TSH; Future  -     T4, free; Future  -     Hemoglobin A1c; Future  -     Lipid Panel; Future  -     Vitamin D 25 hydroxy; Future  -     ASHUTOSH; Future  -     C-reactive protein; Future  -     Rheumatoid Factor; Future  -     Antistreptolysin O screen; Future  -     Uric acid; Future    3. Chronic joint pain  -     Comprehensive metabolic panel; Future  -     CBC w AUTO Differential; Future  -     TSH; Future  -     T4, free; Future  -     Hemoglobin A1c; Future  -     Lipid Panel; Future  -     Vitamin D 25 hydroxy; Future  -     ASHUTOSH; Future  -     C-reactive protein; Future  -     Rheumatoid Factor; Future  -     Antistreptolysin O screen; Future  -     Uric acid; Future    4. Swelling of both hands  -     Comprehensive metabolic panel; Future  -     CBC w AUTO Differential; Future  -     TSH; Future  -     T4, free; Future  -     Hemoglobin A1c; Future  -     Lipid Panel; Future  -     Vitamin D 25 hydroxy; Future  -     ASHUTOSH; Future  -     C-reactive protein; Future  -     " Rheumatoid Factor; Future  -     Antistreptolysin O screen; Future  -     Uric acid; Future    5. Bilateral knee swelling  -     Comprehensive metabolic panel; Future  -     CBC w AUTO Differential; Future  -     TSH; Future  -     T4, free; Future  -     Hemoglobin A1c; Future  -     Lipid Panel; Future  -     Vitamin D 25 hydroxy; Future  -     ASHUTOSH; Future  -     C-reactive protein; Future  -     Rheumatoid Factor; Future  -     Antistreptolysin O screen; Future  -     Uric acid; Future    6. History of night terrors    Return to the clinic in 6 month/s.  Will contact with results as needed.  Above medical issues are stable. Continue on current medications.        This document has been electronically signed by Harrison Tse MD on April 26, 2023 15:42 CDT

## 2023-04-28 ENCOUNTER — TELEPHONE (OUTPATIENT)
Dept: FAMILY MEDICINE CLINIC | Facility: CLINIC | Age: 43
End: 2023-04-28
Payer: OTHER GOVERNMENT

## 2023-04-28 NOTE — TELEPHONE ENCOUNTER
Needs work excuse to be faxed to [phone] Was seen on 4/26/23 at 2:30    Pt call back [phone]

## 2023-05-15 ENCOUNTER — TELEPHONE (OUTPATIENT)
Dept: FAMILY MEDICINE CLINIC | Facility: CLINIC | Age: 43
End: 2023-05-15
Payer: OTHER GOVERNMENT

## 2023-05-15 RX ORDER — CETIRIZINE HYDROCHLORIDE 10 MG/1
10 TABLET ORAL DAILY
Qty: 90 TABLET | Refills: 1 | Status: SHIPPED | OUTPATIENT
Start: 2023-05-15

## 2023-05-15 NOTE — TELEPHONE ENCOUNTER
Incoming Refill Request      Medication requested (name and dose): cetirizine 10 mg 1 X daily     There are more but didn't know the name so will fax a list       [facility] be [facility] [place] [place] [facility] [address]  [facility] [phone] [facility] [phone] FX    Additional details provided by patient: [facility] will have [facility] to [facility] that need to be filled   Fax will be coming from [facility]     Best call back number: [phone]    Does the patient have less than a 3 day supply:  [x] Yes  [] No    Vy Ellis Rep  05/15/23, 13:13 CDT

## 2023-05-17 ENCOUNTER — TELEPHONE (OUTPATIENT)
Dept: FAMILY MEDICINE CLINIC | Facility: CLINIC | Age: 43
End: 2023-05-17
Payer: OTHER GOVERNMENT

## 2023-05-17 NOTE — TELEPHONE ENCOUNTER
Called to see if Dr. Tse had received a fax from the [facility] concerning her medications. Was supposed to do this a couple days ago.    Pt call back is [phone]

## 2023-05-22 ENCOUNTER — TELEPHONE (OUTPATIENT)
Dept: FAMILY MEDICINE CLINIC | Facility: CLINIC | Age: 43
End: 2023-05-22
Payer: OTHER GOVERNMENT

## 2023-05-22 NOTE — TELEPHONE ENCOUNTER
Ms. Royal called and the [facility] was supposed to fax over a medication list last week.  She is needing her medication because she is either out on some or getting low on other.  Looks like the Zyrtec got order but nothing else.    Pt call back is [phone]

## 2023-05-23 ENCOUNTER — TELEPHONE (OUTPATIENT)
Dept: FAMILY MEDICINE CLINIC | Facility: CLINIC | Age: 43
End: 2023-05-23
Payer: OTHER GOVERNMENT

## 2023-05-23 RX ORDER — LACTOBACILLUS ACIDOPHILUS 500MM CELL
1 CAPSULE ORAL 2 TIMES DAILY
COMMUNITY
End: 2023-05-23 | Stop reason: SDUPTHER

## 2023-05-23 RX ORDER — LACTOBACILLUS ACIDOPHILUS 500MM CELL
1 CAPSULE ORAL 2 TIMES DAILY
Qty: 180 CAPSULE | Refills: 1 | Status: SHIPPED | OUTPATIENT
Start: 2023-05-23

## 2023-05-23 NOTE — TELEPHONE ENCOUNTER
Ms. Medrano would like to see if Dr. Tse can prescribe her Lactobacillus Acidophilus.  This was on her Med list that the [facility] was supposed to send to Dr. Tse.  She has been on this medication.    [facility] [place]    Pt call  [phone]

## 2023-07-27 ENCOUNTER — OFFICE VISIT (OUTPATIENT)
Dept: OBSTETRICS AND GYNECOLOGY | Facility: CLINIC | Age: 43
End: 2023-07-27
Payer: OTHER GOVERNMENT

## 2023-07-27 VITALS
HEIGHT: 66 IN | DIASTOLIC BLOOD PRESSURE: 84 MMHG | WEIGHT: 207 LBS | BODY MASS INDEX: 33.27 KG/M2 | SYSTOLIC BLOOD PRESSURE: 136 MMHG

## 2023-07-27 DIAGNOSIS — Z01.419 WOMEN'S ANNUAL ROUTINE GYNECOLOGICAL EXAMINATION: Primary | ICD-10-CM

## 2023-07-27 PROCEDURE — 99396 PREV VISIT EST AGE 40-64: CPT | Performed by: NURSE PRACTITIONER

## 2023-07-27 NOTE — PROGRESS NOTES
Subjective   Marcela Royal is a 43 y.o. Annual gynecological exam    History of Present Illness  Mammo: BI-RADS 2, 09/2022  LMP: 07/21/2023  Pap: NIL, +hrHPV- 02/07/2022    Pt presents for annual gynecological exam with no complaints.    Gynecologic Exam  The patient's pertinent negatives include no genital itching, genital lesions, genital odor, genital rash, missed menses, pelvic pain, vaginal bleeding or vaginal discharge. She is not pregnant. Pertinent negatives include no abdominal pain, chills, constipation, diarrhea, dysuria, fever, flank pain, frequency, headaches, hematuria, rash or urgency. She uses nothing for contraception. Her menstrual history has been regular. Her past medical history is significant for endometriosis, menorrhagia and ovarian cysts.     The following portions of the patient's history were reviewed and updated as appropriate: allergies, current medications, past family history, past medical history, past social history, past surgical history, and problem list.    Review of Systems   Constitutional:  Negative for chills, diaphoresis, fatigue, fever and unexpected weight change.   Respiratory:  Negative for apnea, chest tightness and shortness of breath.    Cardiovascular:  Negative for chest pain and palpitations.   Gastrointestinal:  Negative for abdominal distention, abdominal pain, constipation and diarrhea.   Genitourinary:  Positive for menorrhagia and menstrual problem. Negative for decreased urine volume, difficulty urinating, dysuria, enuresis, flank pain, frequency, genital sores, hematuria, missed menses, pelvic pain, urgency, vaginal bleeding, vaginal discharge and vaginal pain.   Skin:  Negative for rash.   Neurological:  Negative for headaches.   Psychiatric/Behavioral:  Negative for sleep disturbance and suicidal ideas.        Objective   Physical Exam  Vitals and nursing note reviewed. Exam conducted with a chaperone present.   Constitutional:       General:  She is awake. She is not in acute distress.     Appearance: Normal appearance. She is well-developed and well-groomed. She is not ill-appearing, toxic-appearing or diaphoretic.   Neck:      Thyroid: No thyroid mass, thyromegaly or thyroid tenderness.   Cardiovascular:      Rate and Rhythm: Normal rate and regular rhythm.      Heart sounds: Normal heart sounds.   Pulmonary:      Effort: Pulmonary effort is normal.      Breath sounds: Normal breath sounds.   Chest:   Breasts:     Jay Score is 5.      Breasts are symmetrical.      Right: Normal. No swelling, bleeding, inverted nipple, mass, nipple discharge, skin change or tenderness.      Left: Normal. No swelling, bleeding, inverted nipple, mass, nipple discharge, skin change or tenderness.   Abdominal:      General: Bowel sounds are normal. There is no distension.      Palpations: Abdomen is soft.      Tenderness: There is no abdominal tenderness.   Genitourinary:     General: Normal vulva.      Exam position: Lithotomy position.      Jay stage (genital): 5.      Labia:         Right: No rash, tenderness, lesion or injury.         Left: No rash, tenderness, lesion or injury.       Urethra: No prolapse, urethral pain, urethral swelling or urethral lesion.      Vagina: Normal.      Cervix: Normal.      Uterus: Enlarged.       Adnexa:         Right: No mass, tenderness or fullness.          Left: No mass, tenderness or fullness.        Comments: Pap smear obtained. Bilateral adnexa non-palpable.    Lymphadenopathy:      Upper Body:      Right upper body: No supraclavicular, axillary or pectoral adenopathy.      Left upper body: No supraclavicular, axillary or pectoral adenopathy.      Lower Body: No right inguinal adenopathy. No left inguinal adenopathy.   Skin:     General: Skin is warm and dry.   Neurological:      Mental Status: She is alert and oriented to person, place, and time.      Gait: Gait is intact.   Psychiatric:         Attention and Perception:  Attention and perception normal.         Mood and Affect: Mood and affect normal.         Speech: Speech normal.         Behavior: Behavior normal. Behavior is cooperative.         Assessment & Plan   Diagnoses and all orders for this visit:    1. Women's annual routine gynecological examination (Primary)        Patient educated and encouraged to do monthly self breast exam. Mammogram due after 09/27/2023.  If pap smear is normal patient will receive a letter in the mail in about two weeks.  If pap smear is abnormal we will call patient and follow up with plan.

## 2023-08-01 LAB — REF LAB TEST METHOD: NORMAL

## 2023-08-23 ENCOUNTER — LAB (OUTPATIENT)
Dept: LAB | Facility: HOSPITAL | Age: 43
End: 2023-08-23

## 2023-08-23 ENCOUNTER — TRANSCRIBE ORDERS (OUTPATIENT)
Dept: LAB | Facility: HOSPITAL | Age: 43
End: 2023-08-23
Payer: OTHER GOVERNMENT

## 2023-08-23 DIAGNOSIS — E03.2 HYPOTHYROIDISM DUE TO MEDICATION: ICD-10-CM

## 2023-08-23 DIAGNOSIS — G89.29 CHRONIC JOINT PAIN: ICD-10-CM

## 2023-08-23 DIAGNOSIS — Z79.899 ENCOUNTER FOR LONG-TERM (CURRENT) USE OF OTHER MEDICATIONS: Primary | ICD-10-CM

## 2023-08-23 DIAGNOSIS — M25.461 BILATERAL KNEE SWELLING: ICD-10-CM

## 2023-08-23 DIAGNOSIS — R53.82 CHRONIC FATIGUE: ICD-10-CM

## 2023-08-23 DIAGNOSIS — M25.50 CHRONIC JOINT PAIN: ICD-10-CM

## 2023-08-23 DIAGNOSIS — Z79.899 ENCOUNTER FOR LONG-TERM (CURRENT) USE OF OTHER MEDICATIONS: ICD-10-CM

## 2023-08-23 DIAGNOSIS — E78.2 MIXED HYPERLIPIDEMIA: ICD-10-CM

## 2023-08-23 DIAGNOSIS — M25.462 BILATERAL KNEE SWELLING: ICD-10-CM

## 2023-08-23 DIAGNOSIS — M79.89 SWELLING OF BOTH HANDS: ICD-10-CM

## 2023-08-23 LAB
ALBUMIN SERPL-MCNC: 3.8 G/DL (ref 3.5–5.2)
ALBUMIN/GLOB SERPL: 1.3 G/DL
ALP SERPL-CCNC: 80 U/L (ref 39–117)
ALT SERPL W P-5'-P-CCNC: 12 U/L (ref 1–33)
ANION GAP SERPL CALCULATED.3IONS-SCNC: 10.9 MMOL/L (ref 5–15)
AST SERPL-CCNC: 17 U/L (ref 1–32)
BASOPHILS # BLD AUTO: 0.05 10*3/MM3 (ref 0–0.2)
BASOPHILS NFR BLD AUTO: 0.7 % (ref 0–1.5)
BILIRUB SERPL-MCNC: 0.3 MG/DL (ref 0–1.2)
BUN SERPL-MCNC: 12 MG/DL (ref 6–20)
BUN/CREAT SERPL: 13.8 (ref 7–25)
CALCIUM SPEC-SCNC: 9.9 MG/DL (ref 8.6–10.5)
CHLORIDE SERPL-SCNC: 106 MMOL/L (ref 98–107)
CHOLEST SERPL-MCNC: 160 MG/DL (ref 0–200)
CO2 SERPL-SCNC: 22.1 MMOL/L (ref 22–29)
CREAT SERPL-MCNC: 0.87 MG/DL (ref 0.57–1)
DEPRECATED RDW RBC AUTO: 43.3 FL (ref 37–54)
EGFRCR SERPLBLD CKD-EPI 2021: 85.4 ML/MIN/1.73
EOSINOPHIL # BLD AUTO: 0.08 10*3/MM3 (ref 0–0.4)
EOSINOPHIL NFR BLD AUTO: 1.1 % (ref 0.3–6.2)
ERYTHROCYTE [DISTWIDTH] IN BLOOD BY AUTOMATED COUNT: 15.1 % (ref 12.3–15.4)
FSH SERPL-ACNC: 4.91 MIU/ML
GLOBULIN UR ELPH-MCNC: 3 GM/DL
GLUCOSE SERPL-MCNC: 82 MG/DL (ref 65–99)
HCT VFR BLD AUTO: 31.6 % (ref 34–46.6)
HDLC SERPL-MCNC: 57 MG/DL (ref 40–60)
HGB BLD-MCNC: 10.3 G/DL (ref 12–15.9)
IMM GRANULOCYTES # BLD AUTO: 0.01 10*3/MM3 (ref 0–0.05)
IMM GRANULOCYTES NFR BLD AUTO: 0.1 % (ref 0–0.5)
LDLC SERPL CALC-MCNC: 91 MG/DL (ref 0–100)
LDLC/HDLC SERPL: 1.59 {RATIO}
LYMPHOCYTES # BLD AUTO: 1.32 10*3/MM3 (ref 0.7–3.1)
LYMPHOCYTES NFR BLD AUTO: 18.6 % (ref 19.6–45.3)
MCH RBC QN AUTO: 26.1 PG (ref 26.6–33)
MCHC RBC AUTO-ENTMCNC: 32.6 G/DL (ref 31.5–35.7)
MCV RBC AUTO: 80 FL (ref 79–97)
MONOCYTES # BLD AUTO: 0.5 10*3/MM3 (ref 0.1–0.9)
MONOCYTES NFR BLD AUTO: 7.1 % (ref 5–12)
NEUTROPHILS NFR BLD AUTO: 5.12 10*3/MM3 (ref 1.7–7)
NEUTROPHILS NFR BLD AUTO: 72.4 % (ref 42.7–76)
NRBC BLD AUTO-RTO: 0 /100 WBC (ref 0–0.2)
PLATELET # BLD AUTO: 424 10*3/MM3 (ref 140–450)
PMV BLD AUTO: 10.4 FL (ref 6–12)
POTASSIUM SERPL-SCNC: 4.6 MMOL/L (ref 3.5–5.2)
PROLACTIN SERPL-MCNC: 12.3 NG/ML (ref 4.79–23.3)
PROT SERPL-MCNC: 6.8 G/DL (ref 6–8.5)
RBC # BLD AUTO: 3.95 10*6/MM3 (ref 3.77–5.28)
SODIUM SERPL-SCNC: 139 MMOL/L (ref 136–145)
T4 SERPL-MCNC: 6.11 MCG/DL (ref 4.5–11.7)
TESTOST SERPL-MCNC: 25.2 NG/DL (ref 8.4–48.1)
TRIGL SERPL-MCNC: 61 MG/DL (ref 0–150)
TSH SERPL DL<=0.05 MIU/L-ACNC: 1.11 UIU/ML (ref 0.27–4.2)
VLDLC SERPL-MCNC: 12 MG/DL (ref 5–40)
WBC NRBC COR # BLD: 7.08 10*3/MM3 (ref 3.4–10.8)

## 2023-08-23 PROCEDURE — 36415 COLL VENOUS BLD VENIPUNCTURE: CPT

## 2023-08-23 PROCEDURE — 83001 ASSAY OF GONADOTROPIN (FSH): CPT

## 2023-08-23 PROCEDURE — 84146 ASSAY OF PROLACTIN: CPT

## 2023-08-23 PROCEDURE — 84436 ASSAY OF TOTAL THYROXINE: CPT

## 2023-08-23 PROCEDURE — 84403 ASSAY OF TOTAL TESTOSTERONE: CPT

## 2023-08-23 PROCEDURE — 85025 COMPLETE CBC W/AUTO DIFF WBC: CPT

## 2023-08-23 PROCEDURE — 80061 LIPID PANEL: CPT

## 2023-08-23 PROCEDURE — 84443 ASSAY THYROID STIM HORMONE: CPT

## 2023-08-23 PROCEDURE — 82728 ASSAY OF FERRITIN: CPT

## 2023-08-23 PROCEDURE — 80053 COMPREHEN METABOLIC PANEL: CPT

## 2023-08-23 PROCEDURE — 82627 DEHYDROEPIANDROSTERONE: CPT

## 2023-08-24 LAB
DHEA-S SERPL-MCNC: 188 UG/DL (ref 57.3–279.2)
FERRITIN SERPL-MCNC: 13 NG/ML (ref 13–150)

## 2023-08-28 ENCOUNTER — PROCEDURE VISIT (OUTPATIENT)
Dept: OBSTETRICS AND GYNECOLOGY | Facility: CLINIC | Age: 43
End: 2023-08-28
Payer: OTHER GOVERNMENT

## 2023-08-28 VITALS
SYSTOLIC BLOOD PRESSURE: 126 MMHG | WEIGHT: 209 LBS | DIASTOLIC BLOOD PRESSURE: 74 MMHG | BODY MASS INDEX: 33.59 KG/M2 | HEIGHT: 66 IN

## 2023-08-28 DIAGNOSIS — Z32.02 NEGATIVE PREGNANCY TEST: ICD-10-CM

## 2023-08-28 DIAGNOSIS — R87.613 HIGH GRADE SQUAMOUS INTRAEPITHELIAL LESION (HGSIL) ON CYTOLOGIC SMEAR OF CERVIX: Primary | ICD-10-CM

## 2023-08-28 LAB
B-HCG UR QL: NEGATIVE
EXPIRATION DATE: NORMAL
INTERNAL NEGATIVE CONTROL: NEGATIVE
INTERNAL POSITIVE CONTROL: POSITIVE
Lab: NORMAL

## 2023-08-28 NOTE — PROGRESS NOTES
[facility]  Gynecology  Procedure Note: Colposcopy     Work-up    HSIL, +hrHPV pool- 2023  Colpo w/ECC negative 2022   NIL, +hrHPV 2022  Colpo w/ECC negative 2021  ASCUS, +hrHPV 2021  Previous pap smears normal with possible one abnormal when she was younger.     The patient was identified by name and date of birth.  The correct procedure, and correct site identified.  Correct positioning.  There is availability of necessary equipment.  Patient  is not allergic to latex.     PE:  External genitalia: Normal  Vagina: Normal  Cervix: Normal  TMZ: partially visualized  Mosaicism: None  Abnormal vessels: None  ECC: Yes  Biopsies: Yes, 12 and 6 o'clock  Satisfactory colposcopy: Yes    Pt tolerate procedure well.     A/P: Marcela Royal  is a  43 y.o.  presenting with HSIL +hrHPV pool on recent pap smear of cervix.  Two random biopsies of cervix were obtained due to recurrent abnormal pap smear results.    - Colposcopy w/ ECC & Bx- 2  - Encouraged smoking cessation   - RTC PRN pending above results.  - Reviewed instructions including no sex, tampons, or douching for 1 week

## 2023-08-28 NOTE — LETTER
August 30, 2023     Patient: Marcela Royal   YOB: 1980   Date of Visit: 8/28/2023       To Whom it May Concern:    Marcela Royal was seen in my clinic on 8/28/2023. She may return to work on 8/29/23.    If you have any questions or concerns, please don't hesitate to call.         Sincerely,          MICHELLE Pinedo        CC: No Recipients

## 2023-08-31 LAB — REF LAB TEST METHOD: NORMAL

## 2023-09-11 RX ORDER — LACTOBACILLUS ACIDOPHILUS 500MM CELL
1 CAPSULE ORAL 2 TIMES DAILY
Qty: 180 CAPSULE | Refills: 1 | Status: SHIPPED | OUTPATIENT
Start: 2023-09-11

## 2023-09-13 ENCOUNTER — OFFICE VISIT (OUTPATIENT)
Dept: OBSTETRICS AND GYNECOLOGY | Facility: CLINIC | Age: 43
End: 2023-09-13
Payer: OTHER GOVERNMENT

## 2023-09-13 VITALS
SYSTOLIC BLOOD PRESSURE: 136 MMHG | HEIGHT: 66 IN | WEIGHT: 209.6 LBS | DIASTOLIC BLOOD PRESSURE: 74 MMHG | BODY MASS INDEX: 33.68 KG/M2

## 2023-09-13 DIAGNOSIS — N92.6 IRREGULAR BLEEDING: ICD-10-CM

## 2023-09-13 DIAGNOSIS — R87.613 HSIL (HIGH GRADE SQUAMOUS INTRAEPITHELIAL LESION) ON PAP SMEAR OF CERVIX: Primary | ICD-10-CM

## 2023-09-13 DIAGNOSIS — N87.1 MODERATE DYSPLASIA OF CERVIX (CIN II): ICD-10-CM

## 2023-09-13 DIAGNOSIS — Z87.42 HISTORY OF ENDOMETRIOSIS: ICD-10-CM

## 2023-09-13 PROCEDURE — 99214 OFFICE O/P EST MOD 30 MIN: CPT | Performed by: STUDENT IN AN ORGANIZED HEALTH CARE EDUCATION/TRAINING PROGRAM

## 2023-09-13 RX ORDER — SODIUM CHLORIDE 0.9 % (FLUSH) 0.9 %
3 SYRINGE (ML) INJECTION EVERY 12 HOURS SCHEDULED
OUTPATIENT
Start: 2023-09-13

## 2023-09-13 RX ORDER — SODIUM CHLORIDE 9 MG/ML
40 INJECTION, SOLUTION INTRAVENOUS AS NEEDED
OUTPATIENT
Start: 2023-09-13

## 2023-09-13 RX ORDER — SODIUM CHLORIDE, SODIUM LACTATE, POTASSIUM CHLORIDE, CALCIUM CHLORIDE 600; 310; 30; 20 MG/100ML; MG/100ML; MG/100ML; MG/100ML
125 INJECTION, SOLUTION INTRAVENOUS CONTINUOUS
OUTPATIENT
Start: 2023-09-13

## 2023-09-13 RX ORDER — SODIUM CHLORIDE 0.9 % (FLUSH) 0.9 %
10 SYRINGE (ML) INJECTION AS NEEDED
OUTPATIENT
Start: 2023-09-13

## 2023-09-13 NOTE — PROGRESS NOTES
[facility]  Gynecology  Date of Service: 2023    CC: FERNANDO II, presenting for consult for surgical excisional procedure    HPI  Marcela Royal is a 43 y.o.  premenopausal female who presents with complaints of FERNANDO II on colposcopy, high grade pap, desiring to proceed with LEEP after discussion.      CKC/LEEP consult HSIL/hrHPV+  23 colposcopy with Reina: ECC FERNANDO II, bx 6 o'clock and 12 o'clock benign reactive changes, no dysplasia  22 ECC benign  22 NIL/hrHPV+  2021 colpo with ECC benign  2021 ASCUS/hrHPV+  14 NIL   NIL    Patient also with h/o irregular periods. States sine Joselyn having approximately 2 bleeding cycles per month. Long h/o heavy periods and cramping. States cramping worse some months than others. LMP 9/7 having spotting x 7 days so far today. H/o endometriosis, with hsc and laparoscopy with endo treatment previously.    Recently started on Accutane and OCP (unsure name, to send to update chart). States just started 2nd pack and unsure if helping yet. Did help previously. Had previously discussed hysterectomy with Dr. Bradshaw but wasn't interested before, now considering hysterectomy with ovarian conservation.    Not reporting any vaginal itching, burning, irritation, or discharge. Not reporting any urinary symptoms.    ROS  Review of Systems   Constitutional: Negative.    HENT: Negative.     Respiratory: Negative.     Cardiovascular: Negative.    Gastrointestinal: Negative.    Genitourinary:  Positive for menstrual problem and pelvic pain.   Skin: Negative.    Psychiatric/Behavioral: Negative.       GYN HISTORY  Menses: see above  History of STIs: not reported  Last pap smear: see above  Last Completed Pap Smear            PAP SMEAR (Every 3 Years) Next due on 2023  LIQUID-BASED PAP SMEAR WITH HPV GENOTYPING REGARDLESS OF INTERPRETATION (QUINTON,COR,MAD)    2014  Converted (Historical) Gyn Cytology     2008  Converted (Historical) Gyn Cytology                  Abnormal pap smear history: see above; stated prior abnormal pap, but no prior cervical procedures per patient  Contraception: OCP     OB HISTORY  OB History    Para Term  AB Living   3 0 0 0 3 0   SAB IAB Ectopic Molar Multiple Live Births   2 1 0 0 0 0      # Outcome Date GA Lbr Mir/2nd Weight Sex Delivery Anes PTL Lv   3 IAB      TAB      2 SAB      SAB      1 SAB      SAB        PAST MEDICAL HISTORY  Past Medical History:   Diagnosis Date    Abnormal Pap smear of cervix 2020    Allergic 2017    Anxiety     Arthritis     Knees    Depression     Endometriosis determined by laparoscopy     GERD (gastroesophageal reflux disease)     Herpes     Hyperlipidemia     IBS (irritable bowel syndrome)     Migraine     Sickle cell anemia     Urogenital trichomoniasis 10/2021    Varicella      PAST SURGICAL HISTORY  Past Surgical History:   Procedure Laterality Date    COLONOSCOPY N/A 2021    Procedure: COLONOSCOPY;  Surgeon: Diane Mansfield MD;  Location: [facility] ENDOSCOPY;  Service: Gastroenterology;  Laterality: N/A;    ENDOSCOPY N/A 2021    Procedure: ESOPHAGOGASTRODUODENOSCOPY;  Surgeon: Diane Mansfield MD;  Location: [facility] ENDOSCOPY;  Service: Gastroenterology;  Laterality: N/A;    PELVIC LAPAROSCOPY      WISDOM TOOTH EXTRACTION       FAMILY HISTORY  Family History   Problem Relation Age of Onset    Depression Mother     Diabetes Mother     Hypertension Mother     Early death Father     Alcohol abuse Father     No Known Problems Brother     No Known Problems Brother     Breast cancer Maternal Aunt     Cancer Maternal Aunt     Diabetes Maternal Uncle     Breast cancer Paternal Aunt     Cancer Paternal Aunt     Asthma Maternal Grandmother     Colon cancer Maternal Grandmother     Cancer Maternal Grandmother     Breast cancer Paternal Grandmother     Cancer Paternal Grandmother      "Uterine cancer Neg Hx     Ovarian cancer Neg Hx      SOCIAL HISTORY  Social History     Socioeconomic History    Marital status: Legally    Tobacco Use    Smoking status: Every Day     Packs/day: 0.50     Years: 10.00     Pack years: 5.00     Types: Cigarettes     Passive exposure: Current    Smokeless tobacco: Never   Vaping Use    Vaping Use: Never used   Substance and Sexual Activity    Alcohol use: Not Currently     Comment: socially    Drug use: Yes     Types: Marijuana     Comment: 3 DAYS AGO (21)    Sexual activity: Not Currently     Partners: Male   Working on quitting smoking    ALLERGIES  Allergies   Allergen Reactions    Sulfa Antibiotics Shortness Of Breath     HOME MEDICATIONS  Prior to Admission medications    Medication Sig Start Date End Date Taking? Authorizing Provider   Acidophilus Lactobacillus capsule Take 1 capsule by mouth 2 (Two) Times a Day. 23  Yes Harrison Tse MD   cetirizine (zyrTEC) 10 MG tablet Take 1 tablet by mouth Daily. 23  Yes Harrison Tse MD   hydrOXYzine pamoate (VISTARIL) 25 MG capsule Take 1 capsule by mouth 3 (Three) Times a Day As Needed for Anxiety.   Yes ProviderJayde MD   prazosin (MINIPRESS) 2 MG capsule Take 1 capsule by mouth Every Night.   Yes ProviderJayde MD     PE  /74   Ht 167.6 cm (66\")   Wt 95.1 kg (209 lb 9.6 oz)   LMP 2023 (Approximate)   BMI 33.83 kg/m²        General: Alert, healthy, no distress, well nourished and well developed.  Neurologic: Alert, oriented to person, place, and time.  Gait normal.  Cranial nerves II-XII grossly intact.  HEENT: Normocephalic, atraumatic.  Extraocular muscles intact.  Lungs: Normal respiratory effort.    Skin: No rash, no lesions.  Extremities: No cyanosis, clubbing or edema.  PELVIC EXAM:  Deferred with recent colposcopy    IMPRESSION  Marcela Royal is a 43 y.o.  presenting with with FERNANDO II on colposcopy ECC and HSIL pap with HPV+ " presenting for consult for cervical excisional procedure, also with h/o irregular menses within last 6 mos.    PLAN    1. HSIL (high grade squamous intraepithelial lesion) on Pap smear of cervix  2. Moderate dysplasia of cervix (FERNANDO II)  - ECC + FERNANDO II, HSIL pap with HPV+; states h/o abnormal paps, see above  - h/o hrHPV, thinks from ex-; worries about passing to new partner, not yet sexually active    > discussed considering discussing with partner, possible vaccination of partner, incomplete protection unfortunately with condoms; discussed risk of penile cancer, head and neck cancers lower but still possible   - patient states has been vaccinated HPV  - Patient working on stopping smoking  - Discussed option for LEEP or CKC including r/b of each; desires to proceed with LEEP in OR  - Discussed risks/benefits of surgery: risk of infection, bleeding, damage to surrounding structures, perforation, need for additional procedures; discussed risk of anesthesia including heart attack, stroke, and death; plan for outpatient surgery discussed.  - Plan for LEEP with EMB due to irregular menses, see below  - Case Request; Standing  - CBC and Differential; Future  - Basic Metabolic Panel; Future  - Type & Screen; Future  - sodium chloride 0.9 % flush 3 mL  - sodium chloride 0.9 % flush 10 mL  - sodium chloride 0.9 % infusion 40 mL  - lactated ringers infusion  - Case Request    3. Irregular bleeding  4. History of endometriosis  - States h/o endometriosis with prior dx lsc; previously did better on OCP, recently started on BCP as started on Accutane, only on 2nd pack  - Previously was wanting to wait until possible pregnancy, but now with age, symptoms of irregular, heavy, painful periods, h/o endometriosis, thinking about proceeding with hysterectomy with Dr. Bradshaw as previously discussed  - Plan for EMB with LEEP and followup with Dr. Bradshaw after for discussion of possible hysterectomy  - Case Request; Standing  -  CBC and Differential; Future  - Basic Metabolic Panel; Future  - Type & Screen; Future  - sodium chloride 0.9 % flush 3 mL  - sodium chloride 0.9 % flush 10 mL  - sodium chloride 0.9 % infusion 40 mL  - lactated ringers infusion  - Case Request        This document has been electronically signed by Eda Pina DO on September 13, 2023 21:40 CDT

## 2023-09-15 PROBLEM — R87.613 HSIL (HIGH GRADE SQUAMOUS INTRAEPITHELIAL LESION) ON PAP SMEAR OF CERVIX: Status: ACTIVE | Noted: 2023-09-15

## 2023-09-15 PROBLEM — N92.6 IRREGULAR BLEEDING: Status: ACTIVE | Noted: 2023-09-15

## 2023-09-20 ENCOUNTER — HOSPITAL ENCOUNTER (EMERGENCY)
Facility: HOSPITAL | Age: 43
Discharge: HOME | End: 2023-09-20
Attending: EMERGENCY MEDICINE | Admitting: EMERGENCY MEDICINE
Payer: OTHER GOVERNMENT

## 2023-09-20 ENCOUNTER — APPOINTMENT (OUTPATIENT)
Dept: GENERAL RADIOLOGY | Facility: HOSPITAL | Age: 43
End: 2023-09-20
Payer: OTHER GOVERNMENT

## 2023-09-20 VITALS
WEIGHT: 209 LBS | OXYGEN SATURATION: 100 % | BODY MASS INDEX: 33.59 KG/M2 | TEMPERATURE: 98.1 F | SYSTOLIC BLOOD PRESSURE: 121 MMHG | HEIGHT: 66 IN | DIASTOLIC BLOOD PRESSURE: 82 MMHG | HEART RATE: 62 BPM | RESPIRATION RATE: 18 BRPM

## 2023-09-20 DIAGNOSIS — R07.9 CHEST PAIN, UNSPECIFIED TYPE: Primary | ICD-10-CM

## 2023-09-20 DIAGNOSIS — R06.00 DYSPNEA, UNSPECIFIED TYPE: ICD-10-CM

## 2023-09-20 LAB
ALBUMIN SERPL-MCNC: 3.8 G/DL (ref 3.5–5.2)
ALBUMIN/GLOB SERPL: 1 G/DL
ALP SERPL-CCNC: 95 U/L (ref 39–117)
ALT SERPL W P-5'-P-CCNC: 10 U/L (ref 1–33)
ANION GAP SERPL CALCULATED.3IONS-SCNC: 10 MMOL/L (ref 5–15)
AST SERPL-CCNC: 19 U/L (ref 1–32)
BASOPHILS # BLD AUTO: 0.08 10*3/MM3 (ref 0–0.2)
BASOPHILS NFR BLD AUTO: 0.8 % (ref 0–1.5)
BILIRUB SERPL-MCNC: 0.3 MG/DL (ref 0–1.2)
BUN SERPL-MCNC: 8 MG/DL (ref 6–20)
BUN/CREAT SERPL: 9.4 (ref 7–25)
CALCIUM SPEC-SCNC: 9.7 MG/DL (ref 8.6–10.5)
CHLORIDE SERPL-SCNC: 103 MMOL/L (ref 98–107)
CO2 SERPL-SCNC: 23 MMOL/L (ref 22–29)
CREAT SERPL-MCNC: 0.85 MG/DL (ref 0.57–1)
DEPRECATED RDW RBC AUTO: 45.9 FL (ref 37–54)
EGFRCR SERPLBLD CKD-EPI 2021: 87.8 ML/MIN/1.73
EOSINOPHIL # BLD AUTO: 0.13 10*3/MM3 (ref 0–0.4)
EOSINOPHIL NFR BLD AUTO: 1.3 % (ref 0.3–6.2)
ERYTHROCYTE [DISTWIDTH] IN BLOOD BY AUTOMATED COUNT: 15.5 % (ref 12.3–15.4)
FLUAV RNA RESP QL NAA+PROBE: NOT DETECTED
FLUBV RNA RESP QL NAA+PROBE: NOT DETECTED
GLOBULIN UR ELPH-MCNC: 3.7 GM/DL
GLUCOSE SERPL-MCNC: 76 MG/DL (ref 65–99)
HCG SERPL QL: NEGATIVE
HCT VFR BLD AUTO: 33.8 % (ref 34–46.6)
HGB BLD-MCNC: 10.8 G/DL (ref 12–15.9)
IMM GRANULOCYTES # BLD AUTO: 0.03 10*3/MM3 (ref 0–0.05)
IMM GRANULOCYTES NFR BLD AUTO: 0.3 % (ref 0–0.5)
LIPASE SERPL-CCNC: 24 U/L (ref 13–60)
LYMPHOCYTES # BLD AUTO: 1.57 10*3/MM3 (ref 0.7–3.1)
LYMPHOCYTES NFR BLD AUTO: 16 % (ref 19.6–45.3)
MCH RBC QN AUTO: 26 PG (ref 26.6–33)
MCHC RBC AUTO-ENTMCNC: 32 G/DL (ref 31.5–35.7)
MCV RBC AUTO: 81.3 FL (ref 79–97)
MONOCYTES # BLD AUTO: 0.58 10*3/MM3 (ref 0.1–0.9)
MONOCYTES NFR BLD AUTO: 5.9 % (ref 5–12)
NEUTROPHILS NFR BLD AUTO: 7.44 10*3/MM3 (ref 1.7–7)
NEUTROPHILS NFR BLD AUTO: 75.7 % (ref 42.7–76)
NRBC BLD AUTO-RTO: 0 /100 WBC (ref 0–0.2)
NT-PROBNP SERPL-MCNC: 181.2 PG/ML (ref 0–450)
PLATELET # BLD AUTO: 438 10*3/MM3 (ref 140–450)
PMV BLD AUTO: 10.2 FL (ref 6–12)
POTASSIUM SERPL-SCNC: 4.6 MMOL/L (ref 3.5–5.2)
PROT SERPL-MCNC: 7.5 G/DL (ref 6–8.5)
QT INTERVAL: 368 MS
QTC INTERVAL: 402 MS
RBC # BLD AUTO: 4.16 10*6/MM3 (ref 3.77–5.28)
SARS-COV-2 RNA RESP QL NAA+PROBE: NOT DETECTED
SODIUM SERPL-SCNC: 136 MMOL/L (ref 136–145)
TROPONIN T SERPL HS-MCNC: <6 NG/L
WBC NRBC COR # BLD: 9.83 10*3/MM3 (ref 3.4–10.8)
WHOLE BLOOD HOLD COAG: NORMAL

## 2023-09-20 PROCEDURE — 83880 ASSAY OF NATRIURETIC PEPTIDE: CPT | Performed by: EMERGENCY MEDICINE

## 2023-09-20 PROCEDURE — 83690 ASSAY OF LIPASE: CPT | Performed by: EMERGENCY MEDICINE

## 2023-09-20 PROCEDURE — 85025 COMPLETE CBC W/AUTO DIFF WBC: CPT | Performed by: EMERGENCY MEDICINE

## 2023-09-20 PROCEDURE — 99284 EMERGENCY DEPT VISIT MOD MDM: CPT

## 2023-09-20 PROCEDURE — 71045 X-RAY EXAM CHEST 1 VIEW: CPT

## 2023-09-20 PROCEDURE — 96374 THER/PROPH/DIAG INJ IV PUSH: CPT

## 2023-09-20 PROCEDURE — 25010000002 KETOROLAC TROMETHAMINE PER 15 MG: Performed by: EMERGENCY MEDICINE

## 2023-09-20 PROCEDURE — 87636 SARSCOV2 & INF A&B AMP PRB: CPT | Performed by: EMERGENCY MEDICINE

## 2023-09-20 PROCEDURE — 93005 ELECTROCARDIOGRAM TRACING: CPT | Performed by: EMERGENCY MEDICINE

## 2023-09-20 PROCEDURE — 84703 CHORIONIC GONADOTROPIN ASSAY: CPT | Performed by: EMERGENCY MEDICINE

## 2023-09-20 PROCEDURE — 96375 TX/PRO/DX INJ NEW DRUG ADDON: CPT

## 2023-09-20 PROCEDURE — 36415 COLL VENOUS BLD VENIPUNCTURE: CPT

## 2023-09-20 PROCEDURE — 25010000002 ONDANSETRON PER 1 MG: Performed by: EMERGENCY MEDICINE

## 2023-09-20 PROCEDURE — 80053 COMPREHEN METABOLIC PANEL: CPT | Performed by: EMERGENCY MEDICINE

## 2023-09-20 PROCEDURE — 84484 ASSAY OF TROPONIN QUANT: CPT | Performed by: EMERGENCY MEDICINE

## 2023-09-20 RX ORDER — ONDANSETRON 2 MG/ML
4 INJECTION INTRAMUSCULAR; INTRAVENOUS ONCE
Status: COMPLETED | OUTPATIENT
Start: 2023-09-20 | End: 2023-09-20

## 2023-09-20 RX ORDER — KETOROLAC TROMETHAMINE 30 MG/ML
15 INJECTION, SOLUTION INTRAMUSCULAR; INTRAVENOUS ONCE
Status: COMPLETED | OUTPATIENT
Start: 2023-09-20 | End: 2023-09-20

## 2023-09-20 RX ADMIN — KETOROLAC TROMETHAMINE 15 MG: 30 INJECTION, SOLUTION INTRAMUSCULAR; INTRAVENOUS at 11:17

## 2023-09-20 RX ADMIN — ONDANSETRON 4 MG: 2 INJECTION INTRAMUSCULAR; INTRAVENOUS at 11:17

## 2023-09-20 RX ADMIN — SODIUM CHLORIDE 1000 ML: 9 INJECTION, SOLUTION INTRAVENOUS at 11:16

## 2023-09-20 NOTE — ED PROVIDER NOTES
Subjective   History of Present Illness  This is a 43-year-old female who presents for evaluation of chest pain and shortness of breath.  She describes her symptoms as mild.  Gradual onset and present over the past 2 days.  The patient's chest discomfort is described as a retrosternal heaviness.  She is mildly short of breath with exertion.  She denies hemoptysis and productive cough but has had a dry cough.  She has had some myalgias and lightheadedness with nausea and decreased appetite.  She was exposed to several coworkers last week who tested positive for coronavirus.  She denies significant cardiopulmonary past medical history.      Review of Systems   All other systems reviewed and are negative.    Past Medical History:   Diagnosis Date    Abnormal Pap smear of cervix 04/2020    Allergic 2017    Anxiety 2010    Arthritis 2021    Knees    Depression 2015    Endometriosis determined by laparoscopy 2010    GERD (gastroesophageal reflux disease)     Herpes 2010    Hyperlipidemia     IBS (irritable bowel syndrome)     Migraine 2005    Sickle cell anemia 1985    Urogenital trichomoniasis 10/2021    Varicella 1987       Allergies   Allergen Reactions    Sulfa Antibiotics Shortness Of Breath       Past Surgical History:   Procedure Laterality Date    COLONOSCOPY N/A 09/30/2021    Procedure: COLONOSCOPY;  Surgeon: Diane Mansfield MD;  Location: [facility] ENDOSCOPY;  Service: Gastroenterology;  Laterality: N/A;    ENDOSCOPY N/A 09/30/2021    Procedure: ESOPHAGOGASTRODUODENOSCOPY;  Surgeon: Diane Mansfield MD;  Location: [facility] ENDOSCOPY;  Service: Gastroenterology;  Laterality: N/A;    PELVIC LAPAROSCOPY  2010    WISDOM TOOTH EXTRACTION  1997       Family History   Problem Relation Age of Onset    Depression Mother     Diabetes Mother     Hypertension Mother     Early death Father     Alcohol abuse Father     No Known Problems Brother     No Known Problems Brother     Breast cancer Maternal Aunt     Cancer Maternal  Aunt     Diabetes Maternal Uncle     Breast cancer Paternal Aunt     Cancer Paternal Aunt     Asthma Maternal Grandmother     Colon cancer Maternal Grandmother     Cancer Maternal Grandmother     Breast cancer Paternal Grandmother     Cancer Paternal Grandmother     Uterine cancer Neg Hx     Ovarian cancer Neg Hx        Social History     Socioeconomic History    Marital status: Legally    Tobacco Use    Smoking status: Every Day     Packs/day: 0.50     Years: 10.00     Pack years: 5.00     Types: Cigarettes     Passive exposure: Current    Smokeless tobacco: Never   Vaping Use    Vaping Use: Never used   Substance and Sexual Activity    Alcohol use: Not Currently     Comment: socially    Drug use: Yes     Types: Marijuana     Comment: 3 DAYS AGO (9-20-21)    Sexual activity: Not Currently     Partners: Male           Objective   Physical Exam  Vitals and nursing note reviewed.   Constitutional:       General: She is not in acute distress.  HENT:      Head: Normocephalic and atraumatic.   Neck:      Vascular: No JVD.   Cardiovascular:      Rate and Rhythm: Normal rate and regular rhythm.   Pulmonary:      Effort: Pulmonary effort is normal.      Breath sounds: Normal breath sounds.   Chest:      Chest wall: No tenderness.   Abdominal:      Palpations: Abdomen is soft.      Tenderness: There is no abdominal tenderness.   Musculoskeletal:      Right lower leg: No tenderness. No edema.      Left lower leg: No tenderness. No edema.   Skin:     General: Skin is warm and dry.   Neurological:      Mental Status: She is alert and oriented to person, place, and time.   Psychiatric:         Behavior: Behavior normal.       Procedures           ED Course  ED Course as of 09/20/23 1808   Wed Sep 20, 2023   1257 CBC & Differential(!)  Mild stable anemia [JV]      ED Course User Index  [JV] Pablo Haro DO                      HEART Score: 1                      Medical Decision Making  The patient's recent past  "medical charts for this facility as well as outside facilities via the \"care everywhere\" application of epic reviewed.    The differential diagnosis includes acute coronary syndrome, pneumonia, coronavirus, and pleurisy, among others.    On final reevaluation the patient informed nursing that she intended on leaving.  My ability to return to the room to discussed with the patient she had left the emergency department.  COVID test was negative.  Work-up thus far was unremarkable and vital signs were stable.  I think the patient has capacity to make medical decisions.  She is certainly welcome to return to the emergency department for final results and further treatment.  1 troponin pending.  Unfortunately could not discuss risk and benefits with patient prior to her eloping.      Problems Addressed:  Chest pain, unspecified type: complicated acute illness or injury  Dyspnea, unspecified type: complicated acute illness or injury    Amount and/or Complexity of Data Reviewed  Labs: ordered. Decision-making details documented in ED Course.  Radiology: ordered.  ECG/medicine tests: ordered and independent interpretation performed.     Details: EKG interpretation: Interpreted myself.  Normal sinus rhythm.  Rate 72.  Normal P wave and [place] interval.  Normal QRS and axis.  Normal QTc interval.  ST segments are normal.    Risk  OTC drugs.  Prescription drug management.        Final diagnoses:   Chest pain, unspecified type   Dyspnea, unspecified type       ED Disposition  ED Disposition       ED Disposition   Discharge    Condition   Stable    Comment   --               Harrison Tse MD  [address] [place] [address] [phone]    Call today  To make an appointment to be reevaluated after emergency department visit with chest pain and shortness of breath    [facility] EMERGENCY DEPARTMENT  [address]  [facility] [address] [phone]    As needed, If symptoms " worsen at any time         Medication List      No changes were made to your prescriptions during this visit.            Pablo Haro,   09/20/23 180

## 2023-09-20 NOTE — Clinical Note
[facility] EMERGENCY DEPARTMENT  [address] [place] [id number]  Phone: [phone]    Marcela Lacy was seen and treated in our emergency department on 9/20/2023.  She may return to work on 09/22/2023.         Thank you for choosing [facility].    Pablo Haro, DO

## 2023-09-26 ENCOUNTER — TELEPHONE (OUTPATIENT)
Dept: FAMILY MEDICINE CLINIC | Facility: CLINIC | Age: 43
End: 2023-09-26
Payer: OTHER GOVERNMENT

## 2023-09-26 NOTE — TELEPHONE ENCOUNTER
Had blood work done through the ER on 9-20-23 she would like to discuss those results. She states that there were some flags that she is concerned about and would like to discuss with Dr Tse.       Please call back @ [phone]   negative

## 2023-09-27 LAB
QT INTERVAL: 368 MS
QTC INTERVAL: 402 MS

## 2023-09-28 ENCOUNTER — TELEPHONE (OUTPATIENT)
Dept: FAMILY MEDICINE CLINIC | Facility: CLINIC | Age: 43
End: 2023-09-28
Payer: OTHER GOVERNMENT
